# Patient Record
Sex: MALE | Race: OTHER
[De-identification: names, ages, dates, MRNs, and addresses within clinical notes are randomized per-mention and may not be internally consistent; named-entity substitution may affect disease eponyms.]

---

## 2017-11-13 ENCOUNTER — RX ONLY (RX ONLY)
Age: 77
End: 2017-11-13

## 2018-11-07 ENCOUNTER — RX ONLY (RX ONLY)
Age: 78
End: 2018-11-07

## 2021-08-19 ENCOUNTER — TELEPHONE (OUTPATIENT)
Dept: ONCOLOGY | Age: 81
End: 2021-08-19

## 2021-08-19 NOTE — TELEPHONE ENCOUNTER
TYRA with [facility] to call back to schedule consult with md per referral from Dr Carmelo Maharaj    Electronically signed by Juanito Simon on 8/19/2021 at 12:01 PM

## 2021-09-15 ENCOUNTER — INITIAL CONSULT (OUTPATIENT)
Dept: ONCOLOGY | Age: 81
End: 2021-09-15
Payer: MEDICARE

## 2021-09-15 ENCOUNTER — TELEPHONE (OUTPATIENT)
Dept: ONCOLOGY | Age: 81
End: 2021-09-15

## 2021-09-15 ENCOUNTER — HOSPITAL ENCOUNTER (OUTPATIENT)
Age: 81
Discharge: HOME | End: 2021-09-15
Payer: MEDICARE

## 2021-09-15 ENCOUNTER — TELEPHONE (OUTPATIENT)
Dept: CASE MANAGEMENT | Age: 81
End: 2021-09-15

## 2021-09-15 VITALS
WEIGHT: 137.6 LBS | BODY MASS INDEX: 22.11 KG/M2 | HEART RATE: 80 BPM | DIASTOLIC BLOOD PRESSURE: 85 MMHG | TEMPERATURE: 96.8 F | SYSTOLIC BLOOD PRESSURE: 120 MMHG | HEIGHT: 66 IN

## 2021-09-15 DIAGNOSIS — Z85.46 HISTORY OF PROSTATE CANCER: ICD-10-CM

## 2021-09-15 DIAGNOSIS — R91.8 LUNG NODULES: ICD-10-CM

## 2021-09-15 DIAGNOSIS — Z85.51 HISTORY OF BLADDER CANCER: ICD-10-CM

## 2021-09-15 DIAGNOSIS — R91.8 LUNG NODULES: Primary | ICD-10-CM

## 2021-09-15 LAB
ABSOLUTE EOS #: 0.88 K/UL (ref 0–0.4)
ABSOLUTE IMMATURE GRANULOCYTE: ABNORMAL K/UL (ref 0–0.3)
ABSOLUTE LYMPH #: 1.18 K/UL (ref 1–4.8)
ABSOLUTE MONO #: 1.18 K/UL (ref 0.1–0.8)
ALBUMIN SERPL-MCNC: 3.9 G/DL (ref 3.5–5.2)
ALBUMIN/GLOBULIN RATIO: 1.2 (ref 1–2.5)
ALP BLD-CCNC: 144 U/L (ref 40–129)
ALT SERPL-CCNC: 22 U/L (ref 5–41)
ANION GAP SERPL CALCULATED.3IONS-SCNC: 9 MMOL/L (ref 9–17)
AST SERPL-CCNC: 19 U/L
BASOPHILS # BLD: 0 % (ref 0–2)
BASOPHILS ABSOLUTE: 0 K/UL (ref 0–0.2)
BILIRUB SERPL-MCNC: 0.32 MG/DL (ref 0.3–1.2)
BUN BLDV-MCNC: 50 MG/DL (ref 8–23)
BUN/CREAT BLD: ABNORMAL (ref 9–20)
CALCIUM SERPL-MCNC: 10.8 MG/DL (ref 8.6–10.4)
CHLORIDE BLD-SCNC: 110 MMOL/L (ref 98–107)
CO2: 18 MMOL/L (ref 20–31)
CREAT SERPL-MCNC: 1.64 MG/DL (ref 0.7–1.2)
DIFFERENTIAL TYPE: ABNORMAL
EOSINOPHILS RELATIVE PERCENT: 9 % (ref 1–4)
GFR AFRICAN AMERICAN: 49 ML/MIN
GFR NON-AFRICAN AMERICAN: 40 ML/MIN
GFR SERPL CREATININE-BSD FRML MDRD: ABNORMAL ML/MIN/{1.73_M2}
GFR SERPL CREATININE-BSD FRML MDRD: ABNORMAL ML/MIN/{1.73_M2}
GLUCOSE BLD-MCNC: 100 MG/DL (ref 70–99)
HCT VFR BLD CALC: 45.2 % (ref 41–53)
HEMOGLOBIN: 14.6 G/DL (ref 13.5–17.5)
IMMATURE GRANULOCYTES: ABNORMAL %
LYMPHOCYTES # BLD: 12 % (ref 24–44)
MCH RBC QN AUTO: 28.9 PG (ref 26–34)
MCHC RBC AUTO-ENTMCNC: 32.3 G/DL (ref 31–37)
MCV RBC AUTO: 89.5 FL (ref 80–100)
MONOCYTES # BLD: 12 % (ref 1–7)
MORPHOLOGY: NORMAL
NRBC AUTOMATED: ABNORMAL PER 100 WBC
PDW BLD-RTO: 15.7 % (ref 12.5–15.4)
PLATELET # BLD: 253 K/UL (ref 140–450)
PLATELET ESTIMATE: ABNORMAL
PMV BLD AUTO: 7.4 FL (ref 6–12)
POTASSIUM SERPL-SCNC: 5.2 MMOL/L (ref 3.7–5.3)
PROSTATE SPECIFIC ANTIGEN: <0.02 UG/L
RBC # BLD: 5.05 M/UL (ref 4.5–5.9)
RBC # BLD: ABNORMAL 10*6/UL
SEG NEUTROPHILS: 67 % (ref 36–66)
SEGMENTED NEUTROPHILS ABSOLUTE COUNT: 6.56 K/UL (ref 1.8–7.7)
SODIUM BLD-SCNC: 137 MMOL/L (ref 135–144)
TOTAL PROTEIN: 7.2 G/DL (ref 6.4–8.3)
WBC # BLD: 9.8 K/UL (ref 3.5–11)
WBC # BLD: ABNORMAL 10*3/UL

## 2021-09-15 PROCEDURE — G8427 DOCREV CUR MEDS BY ELIG CLIN: HCPCS | Performed by: INTERNAL MEDICINE

## 2021-09-15 PROCEDURE — 80053 COMPREHEN METABOLIC PANEL: CPT

## 2021-09-15 PROCEDURE — 99205 OFFICE O/P NEW HI 60 MIN: CPT | Performed by: INTERNAL MEDICINE

## 2021-09-15 PROCEDURE — 84153 ASSAY OF PSA TOTAL: CPT

## 2021-09-15 PROCEDURE — 85025 COMPLETE CBC W/AUTO DIFF WBC: CPT

## 2021-09-15 PROCEDURE — G8420 CALC BMI NORM PARAMETERS: HCPCS | Performed by: INTERNAL MEDICINE

## 2021-09-15 PROCEDURE — 36415 COLL VENOUS BLD VENIPUNCTURE: CPT

## 2021-09-15 RX ORDER — ACETAMINOPHEN 325 MG/1
650 TABLET ORAL EVERY 6 HOURS PRN
COMMUNITY

## 2021-09-15 NOTE — TELEPHONE ENCOUNTER
Name: Kim Perez  : 1940  MRN: [id number]    Oncology Navigation Follow-Up Note    Navigator received referral from Dr. Enma Morejon and reviewing chart now. Pt. Scheduled for PET , plan to follow.    Electronically signed by Edwin Tejeda RN on 9/15/2021 at 3:08 PM

## 2021-09-15 NOTE — TELEPHONE ENCOUNTER
AVS from 9/15/21    Ct pet  Labs now  rv in 1-2 weeks with ct pet    CT scheduled 9/21/21 @ 8am    Labs drawn today at [facility]    RV scheduled 9/24/21 @ 8am    PT was given AVS and an appt schedule    Electronically signed by Gordon Michelle on 9/15/2021 at 1:56 PM

## 2021-09-15 NOTE — PROGRESS NOTES
Nayana Santo                                                                                                                  9/15/2021  MRN:   [id number]  YOB: 1940  PCP:                           No primary care provider on file. Referring Physician: Cathy Fontenot  Treating Physician Name: Miladis Sotelo MD      Reason for consultation:  Chief Complaint   Patient presents with    Consultation     History of Bladder Cancer    Other     Lung Nodgles       Current problems:  Bilateral lung nodules  History of high-grade urothelial cancer, muscle invasive, pathological stage pT3b pN0 M0-  History of prostate cancer-2010  CKD    Active and recent treatments:  Status post radical prostatectomy-2010 and salvage EBRT-2015  Status post radical robotic cystectomy with PLND and left nephroureterectomy with IC-1/4/2021    Summary of Case/History:    Nayana Santo a 79 y.o.male is a patient with history of hypertension prostate cancer status post prostatectomy in 2010 and salvage radiation therapy several years after was recently diagnosed with muscle invasive bladder cancer. Patient initially presented to outside hospital with complaints of flank pain and constipation. Imaging studies showed left hydronephrosis. Patient underwent cystoscopy on 10/2020 which showed a bladder tumor replacing left ureteral orifice and small tumors on the right and left lateral bladder walls. Patient underwent TURBT on 10/2/2020. Pathology revealed high-grade urothelial cancer muscle invasive. Patient underwent CT PET for staging which was negative for any distant metastasis. Patient underwent radical robotic cystectomy with PLND and left nephroureterectomy in January 2021. Patient post surgical recovery was slow and patient was in and out of the hospital and rehab due to failure to thrive. More recently patient has moved to [place] to be closer to family, his niece.   Patient is currently residing in assisted living and is now doing better. Patient does admit that he has been having memory loss and it was getting difficult for him to take care of himself at home. Patient recently had CT scans done which showed lung nodules concerning for malignancy. Patient is now establishing care in [place] and was seen by urology who referred patient to medical oncology. Patient at the time of evaluation denies any bloody urine. He has a urostomy bag. Denies shortness of breath. Denies any pain. Past Medical History:   Past Medical History:   Diagnosis Date    Bladder cancer ([address]     Chronic kidney disease     Hypertension        Past Surgical History:     Past Surgical History:   Procedure Laterality Date    BLADDER REMOVAL      KIDNEY REMOVAL Left     KNEE ARTHROPLASTY      PROSTATE SURGERY      ROTATOR CUFF REPAIR Right        Patient Family Social History:     Social History     Socioeconomic History    Marital status: Single     Spouse name: None    Number of children: None    Years of education: None    Highest education level: None   Occupational History    None   Tobacco Use    Smoking status: Never Smoker    Smokeless tobacco: Never Used   Substance and Sexual Activity    Alcohol use: Not Currently    Drug use: Never    Sexual activity: None   Other Topics Concern    None   Social History Narrative    None     Social Determinants of Health     Financial Resource Strain:     Difficulty of Paying Living Expenses:    Food Insecurity:     Worried About Running Out of Food in the Last Year:     Ran Out of Food in the Last Year:    Transportation Needs:     Lack of Transportation (Medical):      Lack of Transportation (Non-Medical):    Physical Activity:     Days of Exercise per Week:     Minutes of Exercise per Session:    Stress:     Feeling of Stress :    Social Connections:     Frequency of Communication with Friends and Family:     Frequency of Social Gatherings with Friends and Family:     Attends [employer] Services:     Active Member of Clubs or Organizations:     Attends Club or Organization Meetings:     Marital Status:    Intimate Partner Violence:     Fear of Current or Ex-Partner:     Emotionally Abused:     Physically Abused:     Sexually Abused:      Family History   Problem Relation Age of Onset    Heart Disease Mother     Stroke Father     Breast Cancer Father     Other Sister         ALS    Heart Disease Brother      Current Medications:     Current Outpatient Medications   Medication Sig Dispense Refill    acetaminophen (TYLENOL) 325 MG tablet Take 650 mg by mouth every 6 hours as needed for Pain       No current facility-administered medications for this visit. Allergies:   Codeine    Review of Systems:    Constitutional: No fever or chills. No night sweats, no weight loss   Eyes: No eye discharge, double vision, or eye pain   HEENT: negative for sore mouth, sore throat, hoarseness and voice change   Respiratory: negative for cough , sputum, dyspnea, wheezing, hemoptysis, chest pain   Cardiovascular: negative for chest pain, dyspnea, palpitations, orthopnea, PND   Gastrointestinal: negative for nausea, vomiting, diarrhea, constipation, abdominal pain, Dysphagia, hematemesis and hematochezia   Genitourinary: negative for frequency, dysuria, nocturia, urinary incontinence, and hematuria   Integument: negative for rash, skin lesions, bruises. Hematologic/Lymphatic: negative for easy bruising, bleeding, lymphadenopathy, or petechiae   Endocrine: negative for heat or cold intolerance,weight changes, change in bowel habits and hair loss   Musculoskeletal: negative for myalgias, arthralgias, pain, joint swelling,and bone pain   Neurological: negative for headaches, dizziness, seizures, weakness, numbness.   Positive memory loss        Physical Exam:    Vitals: /85   Pulse 80   Temp 96.8 °F (36 °C) (Temporal)   Ht 5' 6\" (1.676 m)   Wt 137 lb 9.6 oz (62.4 kg)   BMI 22.21 kg/m²   General appearance - well appearing, no in pain or distress  Mental status - AAO X3  Eyes - pupils equal and reactive, extraocular eye movements intact  Mouth - mucous membranes moist, pharynx normal without lesions  Neck - supple, no significant adenopathy  Lymphatics - no palpable lymphadenopathy, no hepatosplenomegaly  Chest - clear to auscultation, no wheezes, rales or rhonchi, symmetric air entry  Heart - normal rate, regular rhythm, normal S1, S2, no murmurs  Abdomen - soft, nontender, nondistended, no masses or organomegaly. urostomy bag in place  Neurological - alert, oriented, normal speech, no focal findings or movement disorder noted  Extremities - peripheral pulses normal, no pedal edema, no clubbing or cyanosis  Skin - normal coloration and turgor, no rashes, no suspicious skin lesions noted       DATA:    SYNOPTIC REPORT FOR BLADDER CARCINOMA:  Cystectomy (p=partial, r=radical, rc=radical cystoprostatectomy): R  Neoadjuvant treatment (y=yes, n=no): N  Invasive/papillary tumor location (r=right lateral, l=left lateral,   a=anterior, p=posterior, d=dome, t=trigone, u=ureter): U, L, T, P  Invasive/papillary tumor size (cm) (na=not applicable): 2.7 CM  Histologic type: UROTHELIAL  Grade (Urothelial: l=low, h=high; Adeno/Squamous cell: 1-3): H  Lymphovascular invasion (n=no, y=yes): Y     Tumor extent (n=no, y=yes, na=not applicable): Invasion of lamina propria: Y  Invasion of inner half of muscularis propria: Y  Invasion of outer half of muscularis propria: Y  Microscopic invasion of perivesicular adipose: Y  Macroscopic invasion of perivesicular adipose:  Y  Prostate stroma, seminal vesicle, uterus, vagina invasion: N  In situ tumor in urethra or prostatic ducts: N  Invasion of pelvic wall or abdominal wall: NA     Margins (n=negative, p=positive, na=not applicable): N  Location of positive margin: NA     Regional lymph nodes (n=no, y=yes, na=not applicable):  Number examined: 28  Number positive: 0  Extranodal extension: NA    Additional findings (ex: invasion of ovary/fallopian tube/bowel): PERINEURAL INVASION  pTNM: pT3b N0  TURBT (10/2/20) by Dr. Elin Rangel     Imaging Studies  CT A/P 7/15/21:  Severe right hydroureter ureter nephrosis despite ureteral stent. * Nonobstructing right renal calculi. * No definitive evidence of malignancy. CT chest 7/15/21:  Multiple bilateral pulmonary nodules largest measures 1 cm in the right upper lobe and right lower lobe.  These could be benign or malignant.  Comparison to old examination from outside institution suggested.  If these are not available, a PET scan could be obtained. Impression:  Bilateral lung nodules  History of high-grade urothelial cancer, muscle invasive, pathological stage pT3b pN0 M0-10/2019. Status post radical robotic cystectomy with PLND and left nephroureterectomy with IC-1/4/2021  History of prostate cancer-2010. Status post radical prostatectomy-2010 and salvage EBRT-2015  CKD     Plan:  I had a detailed discussion with the patient and personally went over results of lab work-up imaging studies and other relevant clinical data. Reviewed records from outside facility. Discussed with patient's niece  Patient has history of prostate cancer as well as bladder cancer. Lung nodules are suspicious for recurrent disease. Given natural history of prostate cancer and bladder cancer I am more concerned about recurrence of bladder cancer. We will check PSA. Reviewed goals and expectations. Patient at this point is interested in further work-up before he makes any decisions. He understands that he may need a biopsy as well down the road  Continue to monitor kidney function. Patient has right-sided hydronephrosis. Patient follows up with urology. Discussed different scenarios. If patient is diagnosed with recurrent prostate cancer we would offer androgen deprivation therapy.   If patient is diagnosed with recurrent

## 2021-09-16 ENCOUNTER — TELEPHONE (OUTPATIENT)
Dept: CASE MANAGEMENT | Age: 81
End: 2021-09-16

## 2021-09-16 NOTE — TELEPHONE ENCOUNTER
Lung Navigator reachig out to pt. Offering assistance, but could only leave a message, plan to follow. 11:30 Received call from MANJU ALVAREZ [facility] and pt. Lives in assisted Living and [facility]  informing navigator she is the first point of contact, plan to follow.

## 2021-09-21 ENCOUNTER — HOSPITAL ENCOUNTER (OUTPATIENT)
Dept: NUCLEAR MEDICINE | Age: 81
Discharge: HOME | End: 2021-09-23
Payer: MEDICARE

## 2021-09-21 DIAGNOSIS — R91.8 LUNG NODULES: ICD-10-CM

## 2021-09-21 DIAGNOSIS — Z85.51 HISTORY OF BLADDER CANCER: ICD-10-CM

## 2021-09-21 DIAGNOSIS — Z85.46 HISTORY OF PROSTATE CANCER: ICD-10-CM

## 2021-09-21 LAB — GLUCOSE BLD-MCNC: 91 MG/DL (ref 75–110)

## 2021-09-21 PROCEDURE — 78815 PET IMAGE W/CT SKULL-THIGH: CPT

## 2021-09-21 PROCEDURE — 82947 ASSAY GLUCOSE BLOOD QUANT: CPT

## 2021-09-21 PROCEDURE — 2580000003 HC RX 258: Performed by: INTERNAL MEDICINE

## 2021-09-21 PROCEDURE — 3430000000 HC RX DIAGNOSTIC RADIOPHARMACEUTICAL: Performed by: INTERNAL MEDICINE

## 2021-09-21 PROCEDURE — A9552 F18 FDG: HCPCS | Performed by: INTERNAL MEDICINE

## 2021-09-21 RX ORDER — SODIUM CHLORIDE 0.9 % (FLUSH) 0.9 %
10 SYRINGE (ML) INJECTION ONCE
Status: COMPLETED | OUTPATIENT
Start: 2021-09-21 | End: 2021-09-21

## 2021-09-21 RX ORDER — FLUDEOXYGLUCOSE F 18 200 MCI/ML
10 INJECTION, SOLUTION INTRAVENOUS
Status: COMPLETED | OUTPATIENT
Start: 2021-09-21 | End: 2021-09-21

## 2021-09-21 RX ADMIN — FLUDEOXYGLUCOSE F 18 12.97 MILLICURIE: 200 INJECTION, SOLUTION INTRAVENOUS at 07:53

## 2021-09-21 RX ADMIN — SODIUM CHLORIDE, PRESERVATIVE FREE 10 ML: 5 INJECTION INTRAVENOUS at 07:53

## 2021-09-22 ENCOUNTER — TELEPHONE (OUTPATIENT)
Dept: INFUSION THERAPY | Age: 81
End: 2021-09-22

## 2021-09-22 NOTE — TELEPHONE ENCOUNTER
Call from [address] Radiology asking for confirmation that our office rec'd PET results. Confirmed results are in chart and pt has f/u this Fri 9/24/21 for results.

## 2021-09-24 ENCOUNTER — TELEPHONE (OUTPATIENT)
Dept: INTERVENTIONAL RADIOLOGY/VASCULAR | Age: 81
End: 2021-09-24

## 2021-09-24 ENCOUNTER — OFFICE VISIT (OUTPATIENT)
Dept: ONCOLOGY | Age: 81
End: 2021-09-24
Payer: MEDICARE

## 2021-09-24 ENCOUNTER — TELEPHONE (OUTPATIENT)
Dept: ONCOLOGY | Age: 81
End: 2021-09-24

## 2021-09-24 ENCOUNTER — TELEPHONE (OUTPATIENT)
Dept: CASE MANAGEMENT | Age: 81
End: 2021-09-24

## 2021-09-24 VITALS
TEMPERATURE: 96.8 F | HEART RATE: 103 BPM | WEIGHT: 139 LBS | SYSTOLIC BLOOD PRESSURE: 119 MMHG | BODY MASS INDEX: 22.44 KG/M2 | DIASTOLIC BLOOD PRESSURE: 78 MMHG

## 2021-09-24 DIAGNOSIS — R91.8 LUNG NODULES: ICD-10-CM

## 2021-09-24 DIAGNOSIS — R54 ADVANCED AGE: ICD-10-CM

## 2021-09-24 DIAGNOSIS — C67.9 MALIGNANT NEOPLASM OF URINARY BLADDER, UNSPECIFIED SITE (HCC): Primary | ICD-10-CM

## 2021-09-24 DIAGNOSIS — Z85.46 HISTORY OF PROSTATE CANCER: ICD-10-CM

## 2021-09-24 PROCEDURE — G8420 CALC BMI NORM PARAMETERS: HCPCS | Performed by: INTERNAL MEDICINE

## 2021-09-24 PROCEDURE — G8427 DOCREV CUR MEDS BY ELIG CLIN: HCPCS | Performed by: INTERNAL MEDICINE

## 2021-09-24 PROCEDURE — 4040F PNEUMOC VAC/ADMIN/RCVD: CPT | Performed by: INTERNAL MEDICINE

## 2021-09-24 PROCEDURE — 1123F ACP DISCUSS/DSCN MKR DOCD: CPT | Performed by: INTERNAL MEDICINE

## 2021-09-24 PROCEDURE — 99211 OFF/OP EST MAY X REQ PHY/QHP: CPT | Performed by: INTERNAL MEDICINE

## 2021-09-24 PROCEDURE — 1036F TOBACCO NON-USER: CPT | Performed by: INTERNAL MEDICINE

## 2021-09-24 PROCEDURE — 99214 OFFICE O/P EST MOD 30 MIN: CPT | Performed by: INTERNAL MEDICINE

## 2021-09-24 NOTE — TELEPHONE ENCOUNTER
AVS from 9/24/21    Bipsy per ir  Preferably at [facility]    rv after biopsy    IR notified of bx    RV TBD    PT was given AVS and an appt schedule    Electronically signed by Emelina Christensen on 9/24/2021 at 12:21 PM

## 2021-09-24 NOTE — TELEPHONE ENCOUNTER
Name: Shey Nelson  : 1940  MRN: [id number]    Oncology Navigation Follow-Up Note  Navigator met with pt. And daughter face to face offering assistance . Business card given to pt. Plan to meet bck with them post visit today. 8:51 Pt. To have biopsy per IR preferrably [facility], but flexible and return post Bx, plan to follow.    Electronically signed by Deandre Sumner RN on 2021 at 8:31 AM

## 2021-09-24 NOTE — PROGRESS NOTES
Worried About [facility] in the Last Year:    [address] in the Last Year:    Transportation Needs:     Lack of Transportation (Medical):  Lack of Transportation (Non-Medical):    Physical Activity:     Days of Exercise per Week:     Minutes of Exercise per Session:    Stress:     Feeling of Stress :    Social Connections:     Frequency of Communication with Friends and Family:     Frequency of Social Gatherings with Friends and Family:     Attends [employer] Services:     Active Member of Clubs or Organizations:     Attends Club or Organization Meetings:     Marital Status:    Intimate Partner Violence:     Fear of Current or Ex-Partner:     Emotionally Abused:     Physically Abused:     Sexually Abused:      Family History   Problem Relation Age of Onset    Heart Disease Mother     Stroke Father     Breast Cancer Father     Other Sister         ALS    Heart Disease Brother      Current Medications:     Current Outpatient Medications   Medication Sig Dispense Refill    acetaminophen (TYLENOL) 325 MG tablet Take 650 mg by mouth every 6 hours as needed for Pain       No current facility-administered medications for this visit. Allergies:   Codeine    Review of Systems:    Constitutional: No fever or chills. No night sweats, no weight loss   Eyes: No eye discharge, double vision, or eye pain   HEENT: negative for sore mouth, sore throat, hoarseness and voice change   Respiratory: negative for cough , sputum, dyspnea, wheezing, hemoptysis, chest pain   Cardiovascular: negative for chest pain, dyspnea, palpitations, orthopnea, PND   Gastrointestinal: negative for nausea, vomiting, diarrhea, constipation, abdominal pain, Dysphagia, hematemesis and hematochezia   Genitourinary: negative for frequency, dysuria, nocturia, urinary incontinence, and hematuria   Integument: negative for rash, skin lesions, bruises.    Hematologic/Lymphatic: negative for easy bruising, bleeding, lymphadenopathy, or petechiae   Endocrine: negative for heat or cold intolerance,weight changes, change in bowel habits and hair loss   Musculoskeletal: negative for myalgias, arthralgias, pain, joint swelling,and bone pain   Neurological: negative for headaches, dizziness, seizures, weakness, numbness. Positive memory loss        Physical Exam:    Vitals: /78   Pulse 103   Temp 96.8 °F (36 °C) (Temporal)   Wt 139 lb (63 kg)   BMI 22.44 kg/m²   General appearance - well appearing, no in pain or distress  Mental status - AAO X3  Eyes - pupils equal and reactive, extraocular eye movements intact  Mouth - mucous membranes moist, pharynx normal without lesions  Neck - supple, no significant adenopathy  Lymphatics - no palpable lymphadenopathy, no hepatosplenomegaly  Chest - clear to auscultation, no wheezes, rales or rhonchi, symmetric air entry  Heart - normal rate, regular rhythm, normal S1, S2, no murmurs  Abdomen - soft, nontender, nondistended, no masses or organomegaly. urostomy bag in place  Neurological - alert, oriented, normal speech, no focal findings or movement disorder noted  Extremities - peripheral pulses normal, no pedal edema, no clubbing or cyanosis  Skin - normal coloration and turgor, no rashes, no suspicious skin lesions noted       DATA:    SYNOPTIC REPORT FOR BLADDER CARCINOMA:  Cystectomy (p=partial, r=radical, rc=radical cystoprostatectomy): R  Neoadjuvant treatment (y=yes, n=no): N  Invasive/papillary tumor location (r=right lateral, l=left lateral,   a=anterior, p=posterior, d=dome, t=trigone, u=ureter): U, L, T, P  Invasive/papillary tumor size (cm) (na=not applicable): 2.7 CM  Histologic type: UROTHELIAL  Grade (Urothelial: l=low, h=high; Adeno/Squamous cell: 1-3): H  Lymphovascular invasion (n=no, y=yes): Y     Tumor extent (n=no, y=yes, na=not applicable):   Invasion of lamina propria: Y  Invasion of inner half of muscularis propria: Y  Invasion of outer half of muscularis propria: Y  Microscopic invasion of perivesicular adipose: Y  Macroscopic invasion of perivesicular adipose: Y  Prostate stroma, seminal vesicle, uterus, vagina invasion: N  In situ tumor in urethra or prostatic ducts: N  Invasion of pelvic wall or abdominal wall: NA     Margins (n=negative, p=positive, na=not applicable): N  Location of positive margin: NA     Regional lymph nodes (n=no, y=yes, na=not applicable):  Number examined: 28  Number positive: 0  Extranodal extension: NA    Additional findings (ex: invasion of ovary/fallopian tube/bowel): PERINEURAL INVASION  pTNM: pT3b N0  TURBT (10/2/20) by Dr. Ila Schilling [facility]     Imaging Studies  CT A/P 7/15/21:  Severe right hydroureter ureter nephrosis despite ureteral stent. * Nonobstructing right renal calculi. * No definitive evidence of malignancy. CT chest 7/15/21:  Multiple bilateral pulmonary nodules largest measures 1 cm in the right upper lobe and right lower lobe.  These could be benign or malignant.  Comparison to old examination from outside institution suggested.  If these are not available, a PET scan could be obtained. Impression:  Bilateral lung nodules  History of high-grade urothelial cancer, muscle invasive, pathological stage pT3b pN0 M0-10/2019. Status post radical robotic cystectomy with PLND and left nephroureterectomy with IC-1/4/2021  History of prostate cancer-2010. Status post radical prostatectomy-2010 and salvage EBRT-2015  CKD     Plan:  I had a detailed discussion with the patient and personally went over results of lab work-up imaging studies and other relevant clinical data. Reviewed records from outside facility  Reviewed results of CT PET  Reviewed images of CT PET with the patient  Reviewed goals and expectations. Patient is interested in pursuing further work-up.   We will arrange for biopsy of one of the lung nodules  PSA was undetectable clinically doubt patient has recurrence of bladder cancer  We will continue to monitor kidney function  Patient has right-sided hydronephrosis. Patient follows up with urology. Discussed different scenarios. If patient is diagnosed with recurrent bladder cancer will consider NGS testing for actionable mutations as well as evaluation for PD-L1 status. NCCN guidelines were reviewed and discussed with the patient. The diagnosis and care plan were discussed with the patient in detail. I discussed the natural history of the disease, prognosis, risks and goals of therapy and answered all the patients questions to the best of my ability. Patient expressed understanding and was in agreement. Anthony Johnson MD      This note is created with the assistance of a speech recognition program.  While intending to generate a document that actually reflects the content of the visit, the document can still have some errors including those of syntax and sound a like substitutions which may escape proof reading. It such instances, actual meaning can be extrapolated by contextual diversion.

## 2021-09-28 ENCOUNTER — TELEPHONE (OUTPATIENT)
Dept: ONCOLOGY | Age: 81
End: 2021-09-28

## 2021-09-28 NOTE — TELEPHONE ENCOUNTER
LVM for patient to schedule follow up appointment 2 weeks after scheduled biopsy    Electronically signed by Jeanne Raoms on 9/28/2021 at 9:02 AM

## 2021-10-06 ENCOUNTER — HOSPITAL ENCOUNTER (OUTPATIENT)
Dept: GENERAL RADIOLOGY | Age: 81
Discharge: HOME | End: 2021-10-08
Payer: MEDICARE

## 2021-10-06 ENCOUNTER — HOSPITAL ENCOUNTER (OUTPATIENT)
Dept: CT IMAGING | Age: 81
Discharge: HOME | End: 2021-10-08
Payer: MEDICARE

## 2021-10-06 VITALS
OXYGEN SATURATION: 100 % | HEIGHT: 66 IN | DIASTOLIC BLOOD PRESSURE: 67 MMHG | HEART RATE: 75 BPM | WEIGHT: 138 LBS | BODY MASS INDEX: 22.18 KG/M2 | TEMPERATURE: 97.3 F | RESPIRATION RATE: 16 BRPM | SYSTOLIC BLOOD PRESSURE: 130 MMHG

## 2021-10-06 DIAGNOSIS — C67.9 MALIGNANT NEOPLASM OF URINARY BLADDER, UNSPECIFIED SITE (HCC): ICD-10-CM

## 2021-10-06 LAB
INR BLD: 1
PARTIAL THROMBOPLASTIN TIME: 26.1 SEC (ref 23.9–33.8)
PLATELET # BLD: 276 K/UL (ref 138–453)
PROTHROMBIN TIME: 13 SEC (ref 11.5–14.2)

## 2021-10-06 PROCEDURE — 88341 IMHCHEM/IMCYTCHM EA ADD ANTB: CPT

## 2021-10-06 PROCEDURE — 85730 THROMBOPLASTIN TIME PARTIAL: CPT

## 2021-10-06 PROCEDURE — 85049 AUTOMATED PLATELET COUNT: CPT

## 2021-10-06 PROCEDURE — 2580000003 HC RX 258: Performed by: RADIOLOGY

## 2021-10-06 PROCEDURE — 88333 PATH CONSLTJ SURG CYTO XM 1: CPT

## 2021-10-06 PROCEDURE — 7100000010 HC PHASE II RECOVERY - FIRST 15 MIN

## 2021-10-06 PROCEDURE — 71045 X-RAY EXAM CHEST 1 VIEW: CPT

## 2021-10-06 PROCEDURE — 2500000003 HC RX 250 WO HCPCS: Performed by: RADIOLOGY

## 2021-10-06 PROCEDURE — 77012 CT SCAN FOR NEEDLE BIOPSY: CPT

## 2021-10-06 PROCEDURE — 7100000011 HC PHASE II RECOVERY - ADDTL 15 MIN

## 2021-10-06 PROCEDURE — 88305 TISSUE EXAM BY PATHOLOGIST: CPT

## 2021-10-06 PROCEDURE — 85610 PROTHROMBIN TIME: CPT

## 2021-10-06 PROCEDURE — 88342 IMHCHEM/IMCYTCHM 1ST ANTB: CPT

## 2021-10-06 PROCEDURE — 2709999900 CT NEEDLE BIOPSY LUNG PERCUTANEOUS W IMAGING GUIDANCE

## 2021-10-06 RX ORDER — SODIUM CHLORIDE 0.9 % (FLUSH) 0.9 %
5-40 SYRINGE (ML) INJECTION PRN
Status: DISCONTINUED | OUTPATIENT
Start: 2021-10-06 | End: 2021-10-09 | Stop reason: HOSPADM

## 2021-10-06 RX ORDER — SODIUM CHLORIDE 9 MG/ML
INJECTION, SOLUTION INTRAVENOUS CONTINUOUS
Status: DISCONTINUED | OUTPATIENT
Start: 2021-10-06 | End: 2021-10-09 | Stop reason: HOSPADM

## 2021-10-06 RX ORDER — ACETAMINOPHEN 325 MG/1
650 TABLET ORAL EVERY 4 HOURS PRN
Status: DISCONTINUED | OUTPATIENT
Start: 2021-10-06 | End: 2021-10-09 | Stop reason: HOSPADM

## 2021-10-06 RX ORDER — LIDOCAINE HYDROCHLORIDE 10 MG/ML
INJECTION, SOLUTION INFILTRATION; PERINEURAL
Status: COMPLETED | OUTPATIENT
Start: 2021-10-06 | End: 2021-10-06

## 2021-10-06 RX ORDER — SODIUM CHLORIDE 9 MG/ML
25 INJECTION, SOLUTION INTRAVENOUS PRN
Status: DISCONTINUED | OUTPATIENT
Start: 2021-10-06 | End: 2021-10-09 | Stop reason: HOSPADM

## 2021-10-06 RX ADMIN — SODIUM CHLORIDE 25 ML: 9 INJECTION, SOLUTION INTRAVENOUS at 09:30

## 2021-10-06 RX ADMIN — LIDOCAINE HYDROCHLORIDE 5 ML: 10 INJECTION, SOLUTION INFILTRATION; PERINEURAL at 10:46

## 2021-10-06 ASSESSMENT — PAIN SCALES - GENERAL
PAINLEVEL_OUTOF10: 0
PAINLEVEL_OUTOF10: 0

## 2021-10-06 ASSESSMENT — PAIN - FUNCTIONAL ASSESSMENT: PAIN_FUNCTIONAL_ASSESSMENT: 0-10

## 2021-10-06 ASSESSMENT — PAIN DESCRIPTION - ORIENTATION: ORIENTATION: LEFT;UPPER;OUTER

## 2021-10-06 ASSESSMENT — PAIN DESCRIPTION - DESCRIPTORS: DESCRIPTORS: TENDER

## 2021-10-06 ASSESSMENT — PAIN DESCRIPTION - LOCATION: LOCATION: BACK

## 2021-10-06 ASSESSMENT — PAIN DESCRIPTION - PAIN TYPE: TYPE: ACUTE PAIN

## 2021-10-06 NOTE — OP NOTE
Brief Postoperative Note    Carmen Kitchen  YOB: 1940  [id number]    Pre-operative Diagnosis: lung nodules    Post-operative Diagnosis: Same    Procedure: ct bx lll nodule    Anesthesia: Local   Surgeons/Assistants: Janie Gilford, MD     Estimated Blood Loss: minimal    Complications: none immediate    Specimens: were obtained      Electronically signed by Janie Gilford, MD on 10/6/2021 at 11:10 AM

## 2021-10-06 NOTE — H&P
Interval H&P Note    Pt Name: Latricia Galloway  MRN: [id number]  YOB: 1940  Date of evaluation: 10/6/2021      [x] I have reviewed in [facility] the Oncology Progress Note by Dr Jessica Cook dated 9/24/21 attached below for an Interval History and Physical note. [x] I have examined  Latricia Galloway  There are no changes to the patient who is scheduled for a lung biopsy with CT in IR by Dr Tracy Yen for malignant neoplasm of bladder. The patient denies new health changes, fever, chills, wheezing, cough, increased SOB, chest pain, open sores or wounds. PMH, Surgical History, Social History, Psych, and Family History reviewed and updated in EPIC in appropriate section. Vital signs: /69   Pulse 93   Temp 97.1 °F (36.2 °C) (Temporal)   Resp 16   Ht 5' 6\" (1.676 m)   Wt 138 lb (62.6 kg)   SpO2 98%   BMI 22.27 kg/m²     Allergies:  Codeine    Medications:    Prior to Admission medications    Medication Sig Start Date End Date Taking? Authorizing Provider   acetaminophen (TYLENOL) 325 MG tablet Take 650 mg by mouth every 6 hours as needed for Pain   Yes Historical Provider, MD         This is a 79 y.o. male who is pleasant, cooperative, alert and oriented x3, in no acute distress    Physical Exam:  Lungs: Bilateral equal air entry, unlabored, clear to ausculation, no wheezing, rales or rhonchi, normal effort  Cardiovascular: HR 93 normal rate, regular rhythm, no murmur, gallop, rub. Abdomen: urostomy draining clear yellow urine in bag. Soft, nontender, nondistended, normal bowel sounds. Labs:  Recent Labs     10/06/21  0931 09/15/21  0952 09/15/21  0952   HGB  --   --  14.6   HCT  --   --  45.2   WBC  --   --  9.8   MCV  --   --  89.5      < > 253   NA  --   --  137   K  --   --  5.2   CL  --   --  110*   CO2  --   --  18*   BUN  --   --  50*   CREATININE  --   --  1.64*   GLUCOSE  --   --  100*   AST  --   --  19   ALT  --   --  22   LABALBU  --   --  3.9    < > = values in this interval not displayed. No results for input(s): COVID19 in the last 720 hours. GABE Anthony CNP   Electronically signed 10/6/2021 at 9:41 Mariano Simmons MD   Physician   Specialty:  Hematology and Oncology   Progress Notes       Signed   Encounter Date:  9/24/2021         Related encounter: Office Visit from 9/24/2021 in [facility]                                                                                                                  9/24/2021  MRN:                           [id number]  YOB: 1940  PCP:                           GABE Montes CNP  Referring Physician: No ref. provider found  Treating Physician Name: Fredrick Hogan MD        Reason for visit:       Chief Complaint   Patient presents with    Follow-up       review status of disease    Results       go over pet scan and labs         Current problems:  Bilateral lung nodules  History of high-grade urothelial cancer, muscle invasive, pathological stage pT3b pN0 M0-  History of prostate cancer-2010  CKD     Active and recent treatments:  Status post radical prostatectomy-2010 and salvage EBRT-2015  Status post radical robotic cystectomy with PLND and left nephroureterectomy with IC-1/4/2021     Interim History:     Patient presents to the clinic accompanied by his niece to discuss results of CT PET. Overall patient doing well. [place] [employer] [place]. Denies any pain. Denies any shortness of breath. Continues to live in assisted living. He has put on couple of pounds since last office visit     During this visit patient's allergy, social, medical, surgical history and medications were reviewed and updated.      Summary of Case/History:     Shira Matamoros a 79 y.o.male is a patient with history of hypertension prostate cancer status post prostatectomy in 2010 and salvage radiation therapy several years after was recently diagnosed with muscle invasive bladder cancer. Patient initially presented to outside hospital with complaints of flank pain and constipation. Imaging studies showed left hydronephrosis. Patient underwent cystoscopy on 10/2020 which showed a bladder tumor replacing left ureteral orifice and small tumors on the right and left lateral bladder walls. Patient underwent TURBT on 10/2/2020. Pathology revealed high-grade urothelial cancer muscle invasive. Patient underwent CT PET for staging which was negative for any distant metastasis. Patient underwent radical robotic cystectomy with PLND and left nephroureterectomy in January 2021. Patient post surgical recovery was slow and patient was in and out of the hospital and rehab due to failure to thrive. More recently patient has moved to [place] to be closer to family, his niece. Patient is currently residing in assisted living and is now doing better. Patient does admit that he has been having memory loss and it was getting difficult for him to take care of himself at home. Patient recently had CT scans done which showed lung nodules concerning for malignancy. Patient is now establishing care in [place] and was seen by urology who referred patient to medical oncology. Patient at the time of evaluation denies any bloody urine. He has a urostomy bag. Denies shortness of breath. Denies any pain.      Past Medical History:   Past Medical History        Past Medical History:   Diagnosis Date    Bladder cancer ([address]      Chronic kidney disease      Hypertension              Past Surgical History:     Past Surgical History         Past Surgical History:   Procedure Laterality Date    BLADDER REMOVAL        KIDNEY REMOVAL Left      KNEE ARTHROPLASTY        PROSTATE SURGERY        ROTATOR CUFF REPAIR Right              Patient Family Social History:     Social History   Social History            Socioeconomic History  Marital status: Single       Spouse name: None    Number of children: None    Years of education: None    Highest education level: None   Occupational History    None   Tobacco Use    Smoking status: Never Smoker    Smokeless tobacco: Never Used   Substance and Sexual Activity    Alcohol use: Not Currently    Drug use: Never    Sexual activity: None   Other Topics Concern    None   Social History Narrative    None      Social Determinants of Health          Financial Resource Strain:     Difficulty of Paying Living Expenses:    Food Insecurity:     Worried About Running Out of Food in the Last Year:     Ran Out of Food in the Last Year:    Transportation Needs:     Lack of Transportation (Medical):  Lack of Transportation (Non-Medical):    Physical Activity:     Days of Exercise per Week:     Minutes of Exercise per Session:    Stress:     Feeling of Stress :    Social Connections:     Frequency of Communication with Friends and Family:     Frequency of Social Gatherings with Friends and Family:     Attends Sabianist Services:     Active Member of Clubs or Organizations:     Attends Club or Organization Meetings:     Marital Status:    Intimate Partner Violence:     Fear of Current or Ex-Partner:     Emotionally Abused:     Physically Abused:     Sexually Abused:          Family History         Family History   Problem Relation Age of Onset    Heart Disease Mother      Stroke Father      Breast Cancer Father      Other Sister           ALS    Heart Disease Brother           Current Medications:     Current Facility-Administered Medications          Current Outpatient Medications   Medication Sig Dispense Refill    acetaminophen (TYLENOL) 325 MG tablet Take 650 mg by mouth every 6 hours as needed for Pain          No current facility-administered medications for this visit. Allergies:   Codeine     Review of Systems:    Constitutional: No fever or chills.  No night sweats, no weight loss   Eyes: No eye discharge, double vision, or eye pain   HEENT: negative for sore mouth, sore throat, hoarseness and voice change   Respiratory: negative for cough , sputum, dyspnea, wheezing, hemoptysis, chest pain   Cardiovascular: negative for chest pain, dyspnea, palpitations, orthopnea, PND   Gastrointestinal: negative for nausea, vomiting, diarrhea, constipation, abdominal pain, Dysphagia, hematemesis and hematochezia   Genitourinary: negative for frequency, dysuria, nocturia, urinary incontinence, and hematuria   Integument: negative for rash, skin lesions, bruises. Hematologic/Lymphatic: negative for easy bruising, bleeding, lymphadenopathy, or petechiae   Endocrine: negative for heat or cold intolerance,weight changes, change in bowel habits and hair loss   Musculoskeletal: negative for myalgias, arthralgias, pain, joint swelling,and bone pain   Neurological: negative for headaches, dizziness, seizures, weakness, numbness. Positive memory loss           Physical Exam:     Vitals: /78   Pulse 103   Temp 96.8 °F (36 °C) (Temporal)   Wt 139 lb (63 kg)   BMI 22.44 kg/m²   General appearance - well appearing, no in pain or distress  Mental status - AAO X3  Eyes - pupils equal and reactive, extraocular eye movements intact  Mouth - mucous membranes moist, pharynx normal without lesions  Neck - supple, no significant adenopathy  Lymphatics - no palpable lymphadenopathy, no hepatosplenomegaly  Chest - clear to auscultation, no wheezes, rales or rhonchi, symmetric air entry  Heart - normal rate, regular rhythm, normal S1, S2, no murmurs  Abdomen - soft, nontender, nondistended, no masses or organomegaly.   urostomy bag in place  Neurological - alert, oriented, normal speech, no focal findings or movement disorder noted  Extremities - peripheral pulses normal, no pedal edema, no clubbing or cyanosis  Skin - normal coloration and turgor, no rashes, no suspicious skin lesions noted cystectomy with PLND and left nephroureterectomy with IC-1/4/2021  History of prostate cancer-2010. Status post radical prostatectomy-2010 and salvage EBRT-2015  CKD      Plan:  I had a detailed discussion with the patient and personally went over results of lab work-up imaging studies and other relevant clinical data. Reviewed records from outside facility  Reviewed results of CT PET  Reviewed images of CT PET with the patient  Reviewed goals and expectations. Patient is interested in pursuing further work-up. We will arrange for biopsy of one of the lung nodules  PSA was undetectable clinically doubt patient has recurrence of bladder cancer  We will continue to monitor kidney function  Patient has right-sided hydronephrosis. Patient follows up with urology. Discussed different scenarios. If patient is diagnosed with recurrent bladder cancer will consider NGS testing for actionable mutations as well as evaluation for PD-L1 status. NCCN guidelines were reviewed and discussed with the patient. The diagnosis and care plan were discussed with the patient in detail. I discussed the natural history of the disease, prognosis, risks and goals of therapy and answered all the patients questions to the best of my ability. Patient expressed understanding and was in agreement. Loy Cast MD       This note is created with the assistance of a speech recognition program.  While intending to generate a document that actually reflects the content of the visit, the document can still have some errors including those of syntax and sound a like substitutions which may escape proof reading. It such instances, actual meaning can be extrapolated by contextual diversion. never

## 2021-10-08 LAB — SURGICAL PATHOLOGY REPORT: NORMAL

## 2021-10-15 ENCOUNTER — TELEPHONE (OUTPATIENT)
Dept: ONCOLOGY | Age: 81
End: 2021-10-15

## 2021-10-15 ENCOUNTER — OFFICE VISIT (OUTPATIENT)
Dept: ONCOLOGY | Age: 81
End: 2021-10-15
Payer: MEDICARE

## 2021-10-15 VITALS
BODY MASS INDEX: 22.56 KG/M2 | HEART RATE: 112 BPM | TEMPERATURE: 96 F | RESPIRATION RATE: 16 BRPM | WEIGHT: 139.8 LBS | SYSTOLIC BLOOD PRESSURE: 135 MMHG | DIASTOLIC BLOOD PRESSURE: 84 MMHG

## 2021-10-15 DIAGNOSIS — Z85.46 HISTORY OF PROSTATE CANCER: ICD-10-CM

## 2021-10-15 DIAGNOSIS — R54 ADVANCED AGE: ICD-10-CM

## 2021-10-15 DIAGNOSIS — R91.8 LUNG NODULES: ICD-10-CM

## 2021-10-15 DIAGNOSIS — C67.9 MALIGNANT NEOPLASM OF URINARY BLADDER, UNSPECIFIED SITE (HCC): Primary | ICD-10-CM

## 2021-10-15 PROCEDURE — G8420 CALC BMI NORM PARAMETERS: HCPCS | Performed by: INTERNAL MEDICINE

## 2021-10-15 PROCEDURE — 99215 OFFICE O/P EST HI 40 MIN: CPT | Performed by: INTERNAL MEDICINE

## 2021-10-15 PROCEDURE — 1036F TOBACCO NON-USER: CPT | Performed by: INTERNAL MEDICINE

## 2021-10-15 PROCEDURE — 99211 OFF/OP EST MAY X REQ PHY/QHP: CPT | Performed by: INTERNAL MEDICINE

## 2021-10-15 PROCEDURE — G8484 FLU IMMUNIZE NO ADMIN: HCPCS | Performed by: INTERNAL MEDICINE

## 2021-10-15 PROCEDURE — 4040F PNEUMOC VAC/ADMIN/RCVD: CPT | Performed by: INTERNAL MEDICINE

## 2021-10-15 PROCEDURE — 1123F ACP DISCUSS/DSCN MKR DOCD: CPT | Performed by: INTERNAL MEDICINE

## 2021-10-15 PROCEDURE — G8427 DOCREV CUR MEDS BY ELIG CLIN: HCPCS | Performed by: INTERNAL MEDICINE

## 2021-10-15 NOTE — TELEPHONE ENCOUNTER
AVS from 10/15/21    Port per [employer] today  [facility]  rv c 1 d 1    Port placement scheduled 10/25/21    AVS given to  to follow precert     Pt will be coming Monday for [facility] draw on Monday 8a-2p    [facility] paperwork completed and given to md to sign and then forwarded to triage    RV to be scheduled with [facility]    AVS given to  to follow precert    PT was given AVS and an appt schedule    Electronically signed by Prema Gregg on 10/15/2021 at 2:08 PM

## 2021-10-15 NOTE — PROGRESS NOTES
Shira Matamoros                                                                                                                  10/15/2021  MRN:   [id number]  YOB: 1940  PCP:                           GABE Montes - CNP  Referring Physician: No ref. provider found  Treating Physician Name: Fredrick Hogan MD      Reason for visit:  Chief Complaint   Patient presents with    Follow-up     biopsy results    Patient presents to the clinic to discuss results of his biopsy. Current problems:  History of high-grade urothelial cancer, muscle invasive, pathological stage pT3b pN0 M0-10/2020  Lung metastasis from urothelial cancer, biopsy-proven-10/2021  History of prostate cancer-2010  CKD    Active and recent treatments:  Status post radical prostatectomy-2010 and salvage EBRT-2015  Status post radical robotic cystectomy with PLND and left nephroureterectomy with IC-1/4/2021    Interim History:    Patient presents to the clinic for a follow-up visit and to discuss further treatment plan he is accompanied by his niece. Biopsy came back positive for urothelial cancer. Patient continues to live in assisted living. Weight is stable. Continues to be active. Denies any pain. Denies any shortness of breath. Denies any hemoptysis    During this visit patient's allergy, social, medical, surgical history and medications were reviewed and updated. Summary of Case/History:    Shira Matamoros a 79 y.o.male is a patient with history of hypertension prostate cancer status post prostatectomy in 2010 and salvage radiation therapy several years after was recently diagnosed with muscle invasive bladder cancer. Patient initially presented to outside hospital with complaints of flank pain and constipation. Imaging studies showed left hydronephrosis.   Patient underwent cystoscopy on 10/2020 which showed a bladder tumor replacing left ureteral orifice and small tumors on the right and left lateral bladder activity: None   Other Topics Concern    None   Social History Narrative    None     Social Determinants of Health     Financial Resource Strain:     Difficulty of Paying Living Expenses:    Food Insecurity:     Worried About Running Out of Food in the Last Year:     [address] in the Last Year:    Transportation Needs:     Lack of Transportation (Medical):  Lack of Transportation (Non-Medical):    Physical Activity:     Days of Exercise per Week:     Minutes of Exercise per Session:    Stress:     Feeling of Stress :    Social Connections:     Frequency of Communication with Friends and Family:     Frequency of Social Gatherings with Friends and Family:     Attends Holiness Services:     Active Member of Clubs or Organizations:     Attends Club or Organization Meetings:     Marital Status:    Intimate Partner Violence:     Fear of Current or Ex-Partner:     Emotionally Abused:     Physically Abused:     Sexually Abused:      Family History   Problem Relation Age of Onset    Heart Disease Mother     Stroke Father     Breast Cancer Father     Other Sister         ALS    Heart Disease Brother      Current Medications:     Current Outpatient Medications   Medication Sig Dispense Refill    acetaminophen (TYLENOL) 325 MG tablet Take 650 mg by mouth every 6 hours as needed for Pain       No current facility-administered medications for this visit. Allergies:   Codeine    Review of Systems:    Constitutional: No fever or chills.  No night sweats, no weight loss   Eyes: No eye discharge, double vision, or eye pain   HEENT: negative for sore mouth, sore throat, hoarseness and voice change   Respiratory: negative for cough , sputum, dyspnea, wheezing, hemoptysis, chest pain   Cardiovascular: negative for chest pain, dyspnea, palpitations, orthopnea, PND   Gastrointestinal: negative for nausea, vomiting, diarrhea, constipation, abdominal pain, Dysphagia, hematemesis and hematochezia Genitourinary: negative for frequency, dysuria, nocturia, urinary incontinence, and hematuria   Integument: negative for rash, skin lesions, bruises. Hematologic/Lymphatic: negative for easy bruising, bleeding, lymphadenopathy, or petechiae   Endocrine: negative for heat or cold intolerance,weight changes, change in bowel habits and hair loss   Musculoskeletal: negative for myalgias, arthralgias, pain, joint swelling,and bone pain   Neurological: negative for headaches, dizziness, seizures, weakness, numbness. Positive memory loss        Physical Exam:    Vitals: /84   Pulse 112   Temp 96 °F (35.6 °C) (Temporal)   Resp 16   Wt 139 lb 12.8 oz (63.4 kg)   BMI 22.56 kg/m²   General appearance - well appearing, no in pain or distress  Mental status - AAO X3  Eyes - pupils equal and reactive, extraocular eye movements intact  Mouth - mucous membranes moist, pharynx normal without lesions  Neck - supple, no significant adenopathy  Lymphatics - no palpable lymphadenopathy, no hepatosplenomegaly  Chest - clear to auscultation, no wheezes, rales or rhonchi, symmetric air entry  Heart - normal rate, regular rhythm, normal S1, S2, no murmurs  Abdomen - soft, nontender, nondistended, no masses or organomegaly.   urostomy bag in place  Neurological - alert, oriented, normal speech, no focal findings or movement disorder noted  Extremities - peripheral pulses normal, no pedal edema, no clubbing or cyanosis  Skin - normal coloration and turgor, no rashes, no suspicious skin lesions noted       DATA:    SYNOPTIC REPORT FOR BLADDER CARCINOMA:  Cystectomy (p=partial, r=radical, rc=radical cystoprostatectomy): R  Neoadjuvant treatment (y=yes, n=no): N  Invasive/papillary tumor location (r=right lateral, l=left lateral,   a=anterior, p=posterior, d=dome, t=trigone, u=ureter): U, L, T, P  Invasive/papillary tumor size (cm) (na=not applicable): 2.7 CM  Histologic type: UROTHELIAL  Grade (Urothelial: l=low, h=high; Adeno/Squamous cell: 1-3): H  Lymphovascular invasion (n=no, y=yes): Y     Tumor extent (n=no, y=yes, na=not applicable): Invasion of lamina propria: Y  Invasion of inner half of muscularis propria: Y  Invasion of outer half of muscularis propria: Y  Microscopic invasion of perivesicular adipose: Y  Macroscopic invasion of perivesicular adipose: Y  Prostate stroma, seminal vesicle, uterus, vagina invasion: N  In situ tumor in urethra or prostatic ducts: N  Invasion of pelvic wall or abdominal wall: NA     Margins (n=negative, p=positive, na=not applicable): N  Location of positive margin: NA     Regional lymph nodes (n=no, y=yes, na=not applicable):  Number examined: 28  Number positive: 0  Extranodal extension: NA    Additional findings (ex: invasion of ovary/fallopian tube/bowel): PERINEURAL INVASION  pTNM: pT3b N0  TURBT (10/2/20) by Dr. Juanjose Muñiz     Imaging Studies  CT A/P 7/15/21:  Severe right hydroureter ureter nephrosis despite ureteral stent. * Nonobstructing right renal calculi. * No definitive evidence of malignancy. CT chest 7/15/21:  Multiple bilateral pulmonary nodules largest measures 1 cm in the right upper lobe and right lower lobe.  These could be benign or malignant.  Comparison to old examination from outside institution suggested.  If these are not available, a PET scan could be obtained. Impression:  History of high-grade urothelial cancer, muscle invasive, pathological stage pT3b pN0 M0-10/2010. Status post radical robotic cystectomy with PLND and left nephroureterectomy with IC-1/4/2021  Pulmonary metastasis, biopsy-proven-10/2021  Anticipating treatment with Keytruda  History of prostate cancer-2010. Status post radical prostatectomy-2010 and salvage EBRT-2015  CKD     Plan:  I had a detailed discussion with the patient and personally went over results of lab work-up imaging studies and other relevant clinical data.   Discuss results of biopsy which is consistent with urothelial cancer. Reviewed goals and expectations. Given CKD patient not a candidate for cisplatin. Reviewed goals and expectations. Discussed treatment options including systemic palliative therapy as well as the supportive care. Patient understands his disease not curable and aims of treatment would be to control disease improve quality of life and prolong life  I am concerned about patient's ability to tolerate cytotoxic therapy  After detailed discussion patient expressed his wishes to proceed with immunotherapy. I will order for Keytruda. We will also order NGS  Reviewed potential side effects of Keytruda reviewed logistics. Continue surveillance for prostate cancer  Patient has right-sided hydronephrosis. Patient follows up with urology. NCCN guidelines were reviewed and discussed with the patient. The diagnosis and care plan were discussed with the patient in detail. I discussed the natural history of the disease, prognosis, risks and goals of therapy and answered all the patients questions to the best of my ability. Patient expressed understanding and was in agreement. Dana Aguero MD      I spent more than 40 minutes examining, evaluating, reviewing data, counseling the patient and coordinating care. Greater than 50% of time was spent face-to-face with the patient this note is created with the assistance of a speech recognition program.  While intending to generate a document that actually reflects the content of the visit, the document can still have some errors including those of syntax and sound a like substitutions which may escape proof reading. It such instances, actual meaning can be extrapolated by contextual diversion.

## 2021-10-19 ENCOUNTER — TELEPHONE (OUTPATIENT)
Dept: INFUSION THERAPY | Age: 81
End: 2021-10-19

## 2021-10-19 ENCOUNTER — TELEPHONE (OUTPATIENT)
Dept: CASE MANAGEMENT | Age: 81
End: 2021-10-19

## 2021-10-19 ENCOUNTER — HOSPITAL ENCOUNTER (OUTPATIENT)
Age: 81
Discharge: HOME | End: 2021-10-19
Payer: MEDICARE

## 2021-10-19 DIAGNOSIS — Z85.46 HISTORY OF PROSTATE CANCER: ICD-10-CM

## 2021-10-19 DIAGNOSIS — C67.9 MALIGNANT NEOPLASM OF URINARY BLADDER, UNSPECIFIED SITE (HCC): ICD-10-CM

## 2021-10-19 DIAGNOSIS — R54 ADVANCED AGE: ICD-10-CM

## 2021-10-19 DIAGNOSIS — R91.8 LUNG NODULES: ICD-10-CM

## 2021-10-19 LAB
ABSOLUTE EOS #: 0.2 K/UL (ref 0–0.4)
ABSOLUTE IMMATURE GRANULOCYTE: ABNORMAL K/UL (ref 0–0.3)
ABSOLUTE LYMPH #: 0.7 K/UL (ref 1–4.8)
ABSOLUTE MONO #: 1.1 K/UL (ref 0.1–1.2)
ALBUMIN SERPL-MCNC: 3.7 G/DL (ref 3.5–5.2)
ALBUMIN/GLOBULIN RATIO: 1.2 (ref 1–2.5)
ALP BLD-CCNC: 147 U/L (ref 40–129)
ALT SERPL-CCNC: 18 U/L (ref 5–41)
ANION GAP SERPL CALCULATED.3IONS-SCNC: 11 MMOL/L (ref 9–17)
AST SERPL-CCNC: 15 U/L
BASOPHILS # BLD: 1 % (ref 0–2)
BASOPHILS ABSOLUTE: 0 K/UL (ref 0–0.2)
BILIRUB SERPL-MCNC: 0.33 MG/DL (ref 0.3–1.2)
BUN BLDV-MCNC: 39 MG/DL (ref 8–23)
BUN/CREAT BLD: ABNORMAL (ref 9–20)
CALCIUM SERPL-MCNC: 10.7 MG/DL (ref 8.6–10.4)
CHLORIDE BLD-SCNC: 109 MMOL/L (ref 98–107)
CO2: 19 MMOL/L (ref 20–31)
CREAT SERPL-MCNC: 1.5 MG/DL (ref 0.7–1.2)
DIFFERENTIAL TYPE: ABNORMAL
EOSINOPHILS RELATIVE PERCENT: 3 % (ref 1–4)
GFR AFRICAN AMERICAN: 54 ML/MIN
GFR NON-AFRICAN AMERICAN: 45 ML/MIN
GFR SERPL CREATININE-BSD FRML MDRD: ABNORMAL ML/MIN/{1.73_M2}
GFR SERPL CREATININE-BSD FRML MDRD: ABNORMAL ML/MIN/{1.73_M2}
GLUCOSE BLD-MCNC: 103 MG/DL (ref 70–99)
HCT VFR BLD CALC: 45.2 % (ref 41–53)
HEMOGLOBIN: 14.9 G/DL (ref 13.5–17.5)
IMMATURE GRANULOCYTES: ABNORMAL %
LYMPHOCYTES # BLD: 8 % (ref 24–44)
MCH RBC QN AUTO: 29.3 PG (ref 26–34)
MCHC RBC AUTO-ENTMCNC: 32.9 G/DL (ref 31–37)
MCV RBC AUTO: 89 FL (ref 80–100)
MONOCYTES # BLD: 12 % (ref 2–11)
NRBC AUTOMATED: ABNORMAL PER 100 WBC
PDW BLD-RTO: 15.7 % (ref 12.5–15.4)
PLATELET # BLD: 298 K/UL (ref 140–450)
PLATELET ESTIMATE: ABNORMAL
PMV BLD AUTO: 7.1 FL (ref 6–12)
POTASSIUM SERPL-SCNC: 5 MMOL/L (ref 3.7–5.3)
RBC # BLD: 5.08 M/UL (ref 4.5–5.9)
RBC # BLD: ABNORMAL 10*6/UL
SEG NEUTROPHILS: 76 % (ref 36–66)
SEGMENTED NEUTROPHILS ABSOLUTE COUNT: 6.9 K/UL (ref 1.8–7.7)
SODIUM BLD-SCNC: 139 MMOL/L (ref 135–144)
TOTAL PROTEIN: 6.7 G/DL (ref 6.4–8.3)
WBC # BLD: 9 K/UL (ref 3.5–11)
WBC # BLD: ABNORMAL 10*3/UL

## 2021-10-19 PROCEDURE — 80053 COMPREHEN METABOLIC PANEL: CPT

## 2021-10-19 PROCEDURE — 36415 COLL VENOUS BLD VENIPUNCTURE: CPT

## 2021-10-19 PROCEDURE — 85025 COMPLETE CBC W/AUTO DIFF WBC: CPT

## 2021-10-19 NOTE — TELEPHONE ENCOUNTER
Name: Trista Lopez  : 1940  MRN: [id number]    Oncology Navigation Follow-Up Note  Navigator received call from pts. Ramon Berman and talked at length about pts. Dementia and is desires as far as treatment is concerned. Pt. Wanting to proceed, but Anahi unsure if pt. Is understanding of the effects treatment could have on him and his quality of life? At the same time Anahi not wanting to discourage pt. From receiving treatment . Pt. May not be cognizant of all that treatment entails , but wanting navigation opinion. Writer sharing with ramon would allow pt. To proceed and if once treatment is initiated pt. May change his mind and that is okay too.    Electronically signed by Felicitas Hunter RN on 10/19/2021 at 4:49 PM

## 2021-10-19 NOTE — TELEPHONE ENCOUNTER
[facility] orders obtained   Faxed demographics, prog note, insurance card and path report to [facility] with confirmation   Mikey Gomez at [facility] RN

## 2021-10-20 LAB
SEND OUT REPORT: NORMAL
TEST NAME: NORMAL

## 2021-10-22 ENCOUNTER — TELEPHONE (OUTPATIENT)
Dept: INFUSION THERAPY | Age: 81
End: 2021-10-22

## 2021-10-22 DIAGNOSIS — C67.9 MALIGNANT NEOPLASM OF URINARY BLADDER, UNSPECIFIED SITE (HCC): Primary | ICD-10-CM

## 2021-10-22 RX ORDER — SODIUM CHLORIDE 9 MG/ML
25 INJECTION, SOLUTION INTRAVENOUS PRN
Status: CANCELLED | OUTPATIENT
Start: 2021-10-22

## 2021-10-22 RX ORDER — SODIUM CHLORIDE 0.9 % (FLUSH) 0.9 %
5-40 SYRINGE (ML) INJECTION PRN
Status: CANCELLED | OUTPATIENT
Start: 2021-10-22

## 2021-10-22 NOTE — TELEPHONE ENCOUNTER
Chemotherapy orders received:    Ht=66 inches  Wv=164 lbs  BSA=1.71  Chemotherapy doses verified:  Keytruda 200 mg flat dose   Bettina Chakraborty RN

## 2021-10-25 ENCOUNTER — HOSPITAL ENCOUNTER (OUTPATIENT)
Dept: INTERVENTIONAL RADIOLOGY/VASCULAR | Age: 81
Discharge: HOME | End: 2021-10-27
Payer: MEDICARE

## 2021-10-25 VITALS
WEIGHT: 138.45 LBS | RESPIRATION RATE: 16 BRPM | TEMPERATURE: 97.3 F | OXYGEN SATURATION: 97 % | SYSTOLIC BLOOD PRESSURE: 117 MMHG | HEIGHT: 66 IN | DIASTOLIC BLOOD PRESSURE: 69 MMHG | HEART RATE: 83 BPM | BODY MASS INDEX: 22.25 KG/M2

## 2021-10-25 DIAGNOSIS — D49.4 BLADDER NEOPLASM: ICD-10-CM

## 2021-10-25 LAB
INR BLD: 1
PARTIAL THROMBOPLASTIN TIME: 40.4 SEC (ref 23.9–33.8)
PLATELET # BLD: 324 K/UL (ref 138–453)
PROTHROMBIN TIME: 12.8 SEC (ref 11.5–14.2)

## 2021-10-25 PROCEDURE — 85610 PROTHROMBIN TIME: CPT

## 2021-10-25 PROCEDURE — 85049 AUTOMATED PLATELET COUNT: CPT

## 2021-10-25 PROCEDURE — 6360000002 HC RX W HCPCS: Performed by: RADIOLOGY

## 2021-10-25 PROCEDURE — 2580000003 HC RX 258: Performed by: RADIOLOGY

## 2021-10-25 PROCEDURE — 7100000010 HC PHASE II RECOVERY - FIRST 15 MIN

## 2021-10-25 PROCEDURE — 36561 INSERT TUNNELED CV CATH: CPT

## 2021-10-25 PROCEDURE — 76937 US GUIDE VASCULAR ACCESS: CPT

## 2021-10-25 PROCEDURE — 85730 THROMBOPLASTIN TIME PARTIAL: CPT

## 2021-10-25 PROCEDURE — 99152 MOD SED SAME PHYS/QHP 5/>YRS: CPT

## 2021-10-25 PROCEDURE — 2500000003 HC RX 250 WO HCPCS: Performed by: RADIOLOGY

## 2021-10-25 PROCEDURE — 77001 FLUOROGUIDE FOR VEIN DEVICE: CPT

## 2021-10-25 PROCEDURE — 99153 MOD SED SAME PHYS/QHP EA: CPT

## 2021-10-25 PROCEDURE — C1894 INTRO/SHEATH, NON-LASER: HCPCS

## 2021-10-25 PROCEDURE — 7100000011 HC PHASE II RECOVERY - ADDTL 15 MIN

## 2021-10-25 RX ORDER — LIDOCAINE HYDROCHLORIDE 10 MG/ML
INJECTION, SOLUTION INFILTRATION; PERINEURAL
Status: COMPLETED | OUTPATIENT
Start: 2021-10-25 | End: 2021-10-25

## 2021-10-25 RX ORDER — SODIUM CHLORIDE 0.9 % (FLUSH) 0.9 %
5-40 SYRINGE (ML) INJECTION PRN
Status: DISCONTINUED | OUTPATIENT
Start: 2021-10-25 | End: 2021-10-28 | Stop reason: HOSPADM

## 2021-10-25 RX ORDER — MIDAZOLAM HYDROCHLORIDE 1 MG/ML
INJECTION INTRAMUSCULAR; INTRAVENOUS
Status: COMPLETED | OUTPATIENT
Start: 2021-10-25 | End: 2021-10-25

## 2021-10-25 RX ORDER — SODIUM CHLORIDE 9 MG/ML
25 INJECTION, SOLUTION INTRAVENOUS PRN
Status: DISCONTINUED | OUTPATIENT
Start: 2021-10-25 | End: 2021-10-28 | Stop reason: HOSPADM

## 2021-10-25 RX ORDER — HEPARIN SODIUM (PORCINE) LOCK FLUSH IV SOLN 100 UNIT/ML 100 UNIT/ML
SOLUTION INTRAVENOUS
Status: COMPLETED | OUTPATIENT
Start: 2021-10-25 | End: 2021-10-25

## 2021-10-25 RX ORDER — ACETAMINOPHEN 325 MG/1
650 TABLET ORAL EVERY 4 HOURS PRN
Status: DISCONTINUED | OUTPATIENT
Start: 2021-10-25 | End: 2021-10-28 | Stop reason: HOSPADM

## 2021-10-25 RX ORDER — FENTANYL CITRATE 50 UG/ML
INJECTION, SOLUTION INTRAMUSCULAR; INTRAVENOUS
Status: COMPLETED | OUTPATIENT
Start: 2021-10-25 | End: 2021-10-25

## 2021-10-25 RX ADMIN — MIDAZOLAM 1 MG: 1 INJECTION INTRAMUSCULAR; INTRAVENOUS at 11:55

## 2021-10-25 RX ADMIN — Medication 50 MCG: at 11:55

## 2021-10-25 RX ADMIN — CEFAZOLIN 1000 MG: 1 INJECTION, POWDER, FOR SOLUTION INTRAMUSCULAR; INTRAVENOUS at 11:32

## 2021-10-25 RX ADMIN — LIDOCAINE HYDROCHLORIDE 5 ML: 10 INJECTION, SOLUTION INFILTRATION; PERINEURAL at 11:55

## 2021-10-25 RX ADMIN — SODIUM CHLORIDE 25 ML: 9 INJECTION, SOLUTION INTRAVENOUS at 09:47

## 2021-10-25 RX ADMIN — Medication 500 UNITS: at 12:20

## 2021-10-25 ASSESSMENT — PAIN SCALES - GENERAL
PAINLEVEL_OUTOF10: 0

## 2021-10-25 ASSESSMENT — PAIN - FUNCTIONAL ASSESSMENT: PAIN_FUNCTIONAL_ASSESSMENT: 0-10

## 2021-10-25 NOTE — PRE SEDATION
Sedation Pre-Procedure Note    Patient Name: Hailey Sheehan   YOB: 1940  Room/Bed: Room/bed info not found  Medical Record Number: [id number]  Date: 10/25/2021   Time: 11:41 AM       Indication:  Urothelial CA    Consent: I have discussed with the patient and/or the patient representative the indication, alternatives, and the possible risks and/or complications of the planned procedure and the anesthesia methods. The patient and/or patient representative appear to understand and agree to proceed. Vital Signs:   Vitals:    10/25/21 0930   BP: 126/77   Pulse: 88   Resp: 16   Temp: 96.9 °F (36.1 °C)   SpO2: 100%       Past Medical History:   has a past medical history of Bladder cancer ([facility] [address] Chronic kidney disease, and Hypertension. Past Surgical History:   has a past surgical history that includes Bladder removal; Kidney removal (Left); Prostate surgery; Knee Arthroplasty; Rotator cuff repair (Right); Lung biopsy (10/06/2021); and CT NEEDLE BIOPSY LUNG PERCUTANEOUS (10/6/2021). Medications:   Scheduled Meds:    ceFAZolin  1,000 mg IntraVENous On Call to OR     Continuous Infusions:    sodium chloride 25 mL (10/25/21 0933)     PRN Meds: sodium chloride, sodium chloride flush  Home Meds:   Prior to Admission medications    Medication Sig Start Date End Date Taking? Authorizing Provider   acetaminophen (TYLENOL) 325 MG tablet Take 650 mg by mouth every 6 hours as needed for Pain   Yes Historical Provider, MD     Coumadin Use Last 7 Days:  no  Antiplatelet drug therapy use last 7 days: no  Other anticoagulant use last 7 days: no  Additional Medication Information:  None      Pre-Sedation Documentation and Exam:   Vital signs have been reviewed (see flow sheet for vitals). I have reviewed the patient's history and review of systems. Lungs: clear, Cardiovascular: regular rate and rhythm.     Mallampati Airway Assessment:  Mallampati Class I - (soft palate, fauces, uvula & anterior/posterior tonsillar pillars are visible)    Prior History of Anesthesia Complications:   none    ASA Classification:  Class 2 - A normal healthy patient with mild systemic disease    Sedation/ Anesthesia Plan:   intravenous sedation    Medications Planned:   midazolam (Versed) intravenously and fentanyl intravenously    Patient is an appropriate candidate for plan of sedation: yes    Electronically signed by Mg Duran MD on 10/25/2021 at 11:41 AM

## 2021-10-25 NOTE — BRIEF OP NOTE
Brief Postoperative Note    Ye Joseph  YOB: 1940  [id number]    Pre-operative Diagnosis: Urothelial Cancer    Post-operative Diagnosis: Same    Procedure: Mediport placement    Anesthesia: Moderate Sedation    Surgeons/Assistants: Dr. Kirill Dutta    Estimated Blood Loss: less than 50     Complications: None    Specimens: Was Not Obtained    Findings: Successful mediport placement, RIJ. Port may be used immediately.     Electronically signed by Nery Chaidez MD on 10/25/2021 at 12:38 PM

## 2021-10-25 NOTE — H&P
Labs:  Recent Labs     10/19/21  0840 10/06/21  0931 10/06/21  0931   HGB 14.9  --   --    HCT 45.2  --   --    WBC 9.0  --   --    MCV 89.0  --   --       < > 276     --   --    K 5.0  --   --    *  --   --    CO2 19*  --   --    BUN 39*  --   --    CREATININE 1.50*  --   --    GLUCOSE 103*  --   --    INR  --   --  1.0   PROTIME  --   --  13.0   APTT  --   --  26.1   AST 15  --   --    ALT 18  --   --    LABALBU 3.7  --   --     < > = values in this interval not displayed. No results for input(s): COVID19 in the last 720 hours. GABE Bailey CNP   Electronically signed 10/25/2021 at 9:43 Eddy Gregory MD   Physician   Specialty:  Hematology and Oncology   Progress Notes      Signed   Encounter Date:  10/15/2021         Related encounter: Office Visit from 10/15/2021 in [facility] all  [x]Manual[x]Template[x]Copied    Added by:  Tasha Spain MD    []Armida for details             Neal Greenwood                                                                                                                  10/15/2021  MRN:                           [id number]  YOB: 1940  PCP:                           GABE Talley CNP  Referring Physician: No ref. provider found  Treating Physician Name: Conner Sanford MD        Reason for visit:       Chief Complaint   Patient presents with    Follow-up       biopsy results    Patient presents to the clinic to discuss results of his biopsy.      Current problems:  History of high-grade urothelial cancer, muscle invasive, pathological stage pT3b pN0 M0-10/2020  Lung metastasis from urothelial cancer, biopsy-proven-10/2021  History of prostate cancer-2010  CKD     Active and recent treatments:  Status post radical prostatectomy-2010 and salvage EBRT-2015  Status post radical robotic cystectomy with PLND and left nephroureterectomy with IC-1/4/2021     Interim History:     Patient presents to the clinic for a follow-up visit and to discuss further treatment plan he is accompanied by his niece. Biopsy came back positive for urothelial cancer. Patient continues to live in assisted living. Weight is stable. Continues to be active. Denies any pain. Denies any shortness of breath. Denies any hemoptysis     During this visit patient's allergy, social, medical, surgical history and medications were reviewed and updated. Summary of Case/History:     Palma Brennan a 79 y.o.male is a patient with history of hypertension prostate cancer status post prostatectomy in 2010 and salvage radiation therapy several years after was recently diagnosed with muscle invasive bladder cancer. Patient initially presented to outside hospital with complaints of flank pain and constipation. Imaging studies showed left hydronephrosis. Patient underwent cystoscopy on 10/2020 which showed a bladder tumor replacing left ureteral orifice and small tumors on the right and left lateral bladder walls. Patient underwent TURBT on 10/2/2020. Pathology revealed high-grade urothelial cancer muscle invasive. Patient underwent CT PET for staging which was negative for any distant metastasis. Patient underwent radical robotic cystectomy with PLND and left nephroureterectomy in January 2021. Patient post surgical recovery was slow and patient was in and out of the hospital and rehab due to failure to thrive. More recently patient has moved to [place] to be closer to family, his niece. Patient is currently residing in assisted living and is now doing better. Patient does admit that he has been having memory loss and it was getting difficult for him to take care of himself at home. Patient recently had CT scans done which showed lung nodules concerning for malignancy.   Patient is now establishing care in [place] and was seen by urology who referred patient to medical oncology. Patient at the time of evaluation denies any bloody urine. He has a urostomy bag. Denies shortness of breath. Denies any pain. Past Medical History:   Past Medical History        Past Medical History:   Diagnosis Date    Bladder cancer ([address]      Chronic kidney disease      Hypertension              Past Surgical History:     Past Surgical History         Past Surgical History:   Procedure Laterality Date    BLADDER REMOVAL        CT NEEDLE BIOPSY LUNG PERCUTANEOUS   10/6/2021     CT NEEDLE BIOPSY LUNG PERCUTANEOUS 10/6/2021 [facility] CT SCAN    KIDNEY REMOVAL Left      KNEE ARTHROPLASTY        LUNG BIOPSY   10/06/2021    PROSTATE SURGERY        ROTATOR CUFF REPAIR Right              Patient Family Social History:     Social History   Social History            Socioeconomic History    Marital status: Single       Spouse name: None    Number of children: None    Years of education: None    Highest education level: None   Occupational History    None   Tobacco Use    Smoking status: Never Smoker    Smokeless tobacco: Never Used   Substance and Sexual Activity    Alcohol use: Not Currently    Drug use: Never    Sexual activity: None   Other Topics Concern    None   Social History Narrative    None      Social Determinants of Health          Financial Resource Strain:     Difficulty of Paying Living Expenses:    Food Insecurity:     Worried About Running Out of Food in the Last Year:     Ran Out of Food in the Last Year:    Transportation Needs:     Lack of Transportation (Medical):      Lack of Transportation (Non-Medical):    Physical Activity:     Days of Exercise per Week:     Minutes of Exercise per Session:    Stress:     Feeling of Stress :    Social Connections:     Frequency of Communication with Friends and Family:     Frequency of Social Gatherings with Friends and Family:     Attends [employer] Services:     Active Member of Clubs or Organizations:     Attends Club or Organization Meetings:     Marital Status:    Intimate Partner Violence:     Fear of Current or Ex-Partner:     Emotionally Abused:     Physically Abused:     Sexually Abused:          Family History         Family History   Problem Relation Age of Onset    Heart Disease Mother      Stroke Father      Breast Cancer Father      Other Sister           ALS    Heart Disease Brother           Current Medications:     Current Facility-Administered Medications          Current Outpatient Medications   Medication Sig Dispense Refill    acetaminophen (TYLENOL) 325 MG tablet Take 650 mg by mouth every 6 hours as needed for Pain          No current facility-administered medications for this visit. Allergies:   Codeine     Review of Systems:    Constitutional: No fever or chills. No night sweats, no weight loss   Eyes: No eye discharge, double vision, or eye pain   HEENT: negative for sore mouth, sore throat, hoarseness and voice change   Respiratory: negative for cough , sputum, dyspnea, wheezing, hemoptysis, chest pain   Cardiovascular: negative for chest pain, dyspnea, palpitations, orthopnea, PND   Gastrointestinal: negative for nausea, vomiting, diarrhea, constipation, abdominal pain, Dysphagia, hematemesis and hematochezia   Genitourinary: negative for frequency, dysuria, nocturia, urinary incontinence, and hematuria   Integument: negative for rash, skin lesions, bruises. Hematologic/Lymphatic: negative for easy bruising, bleeding, lymphadenopathy, or petechiae   Endocrine: negative for heat or cold intolerance,weight changes, change in bowel habits and hair loss   Musculoskeletal: negative for myalgias, arthralgias, pain, joint swelling,and bone pain   Neurological: negative for headaches, dizziness, seizures, weakness, numbness.   Positive memory loss           Physical Exam:     Vitals: /84   Pulse 112   Temp 96 °F (35.6 °C) (Temporal)   Resp 16   Wt 139 lb 12.8 oz (63.4 kg)   BMI 22.56 kg/m²   General appearance - well appearing, no in pain or distress  Mental status - AAO X3  Eyes - pupils equal and reactive, extraocular eye movements intact  Mouth - mucous membranes moist, pharynx normal without lesions  Neck - supple, no significant adenopathy  Lymphatics - no palpable lymphadenopathy, no hepatosplenomegaly  Chest - clear to auscultation, no wheezes, rales or rhonchi, symmetric air entry  Heart - normal rate, regular rhythm, normal S1, S2, no murmurs  Abdomen - soft, nontender, nondistended, no masses or organomegaly. urostomy bag in place  Neurological - alert, oriented, normal speech, no focal findings or movement disorder noted  Extremities - peripheral pulses normal, no pedal edema, no clubbing or cyanosis  Skin - normal coloration and turgor, no rashes, no suspicious skin lesions noted         DATA:     SYNOPTIC REPORT FOR BLADDER CARCINOMA:  Cystectomy (p=partial, r=radical, rc=radical cystoprostatectomy): R  Neoadjuvant treatment (y=yes, n=no): N  Invasive/papillary tumor location (r=right lateral, l=left lateral,   a=anterior, p=posterior, d=dome, t=trigone, u=ureter): U, L, T, P  Invasive/papillary tumor size (cm) (na=not applicable): 2.7 CM  Histologic type: UROTHELIAL  Grade (Urothelial: l=low, h=high; Adeno/Squamous cell: 1-3): H  Lymphovascular invasion (n=no, y=yes): Y     Tumor extent (n=no, y=yes, na=not applicable): Invasion of lamina propria: Y  Invasion of inner half of muscularis propria: Y  Invasion of outer half of muscularis propria: Y  Microscopic invasion of perivesicular adipose: Y  Macroscopic invasion of perivesicular adipose:  Y  Prostate stroma, seminal vesicle, uterus, vagina invasion: N  In situ tumor in urethra or prostatic ducts: N  Invasion of pelvic wall or abdominal wall: NA     Margins (n=negative, p=positive, na=not applicable): N  Location of positive margin: NA     Regional lymph nodes (n=no, y=yes, na=not applicable):  Number examined: 28  Number positive: 0  Extranodal extension: NA    Additional findings (ex: invasion of ovary/fallopian tube/bowel): PERINEURAL INVASION  pTNM: pT3b N0  TURBT (10/2/20) by Dr. Eliz Cantu      Imaging Studies  CT A/P 7/15/21:  Severe right hydroureter ureter nephrosis despite ureteral stent. * Nonobstructing right renal calculi. * No definitive evidence of malignancy. CT chest 7/15/21:  Multiple bilateral pulmonary nodules largest measures 1 cm in the right upper lobe and right lower lobe. These could be benign or malignant. Comparison to old examination from outside institution suggested. If these are not available, a PET scan could be obtained. Impression:  History of high-grade urothelial cancer, muscle invasive, pathological stage pT3b pN0 M0-10/2010. Status post radical robotic cystectomy with PLND and left nephroureterectomy with IC-1/4/2021  Pulmonary metastasis, biopsy-proven-10/2021  Anticipating treatment with Keytruda  History of prostate cancer-2010. Status post radical prostatectomy-2010 and salvage EBRT-2015  CKD      Plan:  I had a detailed discussion with the patient and personally went over results of lab work-up imaging studies and other relevant clinical data. Discuss results of biopsy which is consistent with urothelial cancer. Reviewed goals and expectations. Given CKD patient not a candidate for cisplatin. Reviewed goals and expectations. Discussed treatment options including systemic palliative therapy as well as the supportive care. Patient understands his disease not curable and aims of treatment would be to control disease improve quality of life and prolong life  I am concerned about patient's ability to tolerate cytotoxic therapy  After detailed discussion patient expressed his wishes to proceed with immunotherapy. I will order for Keytruda.   We will also order NGS  Reviewed potential side effects of Keytruda reviewed logistics. Continue surveillance for prostate cancer  Patient has right-sided hydronephrosis. Patient follows up with urology. NCCN guidelines were reviewed and discussed with the patient. The diagnosis and care plan were discussed with the patient in detail. I discussed the natural history of the disease, prognosis, risks and goals of therapy and answered all the patients questions to the best of my ability. Patient expressed understanding and was in agreement. Ulisses Rice MD       I spent more than 40 minutes examining, evaluating, reviewing data, counseling the patient and coordinating care. Greater than 50% of time was spent face-to-face with the patient this note is created with the assistance of a speech recognition program.  While intending to generate a document that actually reflects the content of the visit, the document can still have some errors including those of syntax and sound a like substitutions which may escape proof reading. It such instances, actual meaning can be extrapolated by contextual diversion.

## 2021-10-26 ENCOUNTER — OFFICE VISIT (OUTPATIENT)
Dept: ONCOLOGY | Age: 81
End: 2021-10-26
Payer: MEDICARE

## 2021-10-26 DIAGNOSIS — C67.9 MALIGNANT NEOPLASM OF URINARY BLADDER, UNSPECIFIED SITE (HCC): Primary | ICD-10-CM

## 2021-10-26 PROCEDURE — 99999 PR OFFICE/OUTPT VISIT,PROCEDURE ONLY: CPT | Performed by: INTERNAL MEDICINE

## 2021-10-26 NOTE — PROGRESS NOTES
Nivia Gray and his niecehere for chemotherapy teaching. Spent 60 minutes with them   Teaching sheets from [facility] and verbal information given on chemotherapy agents, action, administration and side effects. Provided books chemotherapy and you and Eating Hints: Before During, and After Cancer treatment.   Handout about unit with phone numbers for [facility]      Jose discussed: Elisabeth Pimentel     Discussed implanted port and demonstrated, port placed yesterday, area pink, slight tender        Questions answered, support given

## 2021-10-27 ENCOUNTER — TELEPHONE (OUTPATIENT)
Dept: CASE MANAGEMENT | Age: 81
End: 2021-10-27

## 2021-10-27 ENCOUNTER — HOSPITAL ENCOUNTER (OUTPATIENT)
Dept: INFUSION THERAPY | Age: 81
Discharge: HOME | End: 2021-10-27
Payer: MEDICARE

## 2021-10-27 ENCOUNTER — OFFICE VISIT (OUTPATIENT)
Dept: ONCOLOGY | Age: 81
End: 2021-10-27
Payer: MEDICARE

## 2021-10-27 ENCOUNTER — TELEPHONE (OUTPATIENT)
Dept: ONCOLOGY | Age: 81
End: 2021-10-27

## 2021-10-27 VITALS
DIASTOLIC BLOOD PRESSURE: 69 MMHG | BODY MASS INDEX: 22.05 KG/M2 | WEIGHT: 136.6 LBS | TEMPERATURE: 97.4 F | HEART RATE: 79 BPM | SYSTOLIC BLOOD PRESSURE: 116 MMHG

## 2021-10-27 DIAGNOSIS — R91.8 LUNG NODULES: ICD-10-CM

## 2021-10-27 DIAGNOSIS — Z85.46 HISTORY OF PROSTATE CANCER: ICD-10-CM

## 2021-10-27 DIAGNOSIS — C67.9 MALIGNANT NEOPLASM OF URINARY BLADDER, UNSPECIFIED SITE (HCC): Primary | ICD-10-CM

## 2021-10-27 DIAGNOSIS — R54 ADVANCED AGE: ICD-10-CM

## 2021-10-27 LAB
ABSOLUTE EOS #: 0.4 K/UL (ref 0–0.4)
ABSOLUTE IMMATURE GRANULOCYTE: ABNORMAL K/UL (ref 0–0.3)
ABSOLUTE LYMPH #: 0.9 K/UL (ref 1–4.8)
ABSOLUTE MONO #: 0.9 K/UL (ref 0.1–1.2)
ALBUMIN SERPL-MCNC: 3.1 G/DL (ref 3.5–5.2)
ALBUMIN/GLOBULIN RATIO: 1.1 (ref 1–2.5)
ALP BLD-CCNC: 141 U/L (ref 40–129)
ALT SERPL-CCNC: 15 U/L (ref 5–41)
AMYLASE: 97 U/L (ref 28–100)
ANION GAP SERPL CALCULATED.3IONS-SCNC: 9 MMOL/L (ref 9–17)
AST SERPL-CCNC: 15 U/L
BASOPHILS # BLD: 1 % (ref 0–2)
BASOPHILS ABSOLUTE: 0.1 K/UL (ref 0–0.2)
BILIRUB SERPL-MCNC: 0.22 MG/DL (ref 0.3–1.2)
BUN BLDV-MCNC: 39 MG/DL (ref 8–23)
BUN/CREAT BLD: ABNORMAL (ref 9–20)
CALCIUM SERPL-MCNC: 9.5 MG/DL (ref 8.6–10.4)
CHLORIDE BLD-SCNC: 114 MMOL/L (ref 98–107)
CO2: 17 MMOL/L (ref 20–31)
CORTISOL COLLECTION INFO: ABNORMAL
CORTISOL: 19.6 UG/DL (ref 2.7–18.4)
CREAT SERPL-MCNC: 1.33 MG/DL (ref 0.7–1.2)
DIFFERENTIAL TYPE: ABNORMAL
EOSINOPHILS RELATIVE PERCENT: 6 % (ref 1–4)
GFR AFRICAN AMERICAN: >60 ML/MIN
GFR NON-AFRICAN AMERICAN: 51 ML/MIN
GFR SERPL CREATININE-BSD FRML MDRD: ABNORMAL ML/MIN/{1.73_M2}
GFR SERPL CREATININE-BSD FRML MDRD: ABNORMAL ML/MIN/{1.73_M2}
GLUCOSE BLD-MCNC: 96 MG/DL (ref 70–99)
HCT VFR BLD CALC: 40.9 % (ref 41–53)
HEMOGLOBIN: 13.3 G/DL (ref 13.5–17.5)
IMMATURE GRANULOCYTES: ABNORMAL %
LIPASE: 28 U/L (ref 13–60)
LYMPHOCYTES # BLD: 14 % (ref 24–44)
MCH RBC QN AUTO: 29 PG (ref 26–34)
MCHC RBC AUTO-ENTMCNC: 32.6 G/DL (ref 31–37)
MCV RBC AUTO: 88.7 FL (ref 80–100)
MONOCYTES # BLD: 13 % (ref 2–11)
NRBC AUTOMATED: ABNORMAL PER 100 WBC
PDW BLD-RTO: 15.4 % (ref 12.5–15.4)
PLATELET # BLD: 294 K/UL (ref 140–450)
PLATELET ESTIMATE: ABNORMAL
PMV BLD AUTO: 7 FL (ref 6–12)
POTASSIUM SERPL-SCNC: 4.1 MMOL/L (ref 3.7–5.3)
RBC # BLD: 4.61 M/UL (ref 4.5–5.9)
RBC # BLD: ABNORMAL 10*6/UL
SEG NEUTROPHILS: 66 % (ref 36–66)
SEGMENTED NEUTROPHILS ABSOLUTE COUNT: 4.4 K/UL (ref 1.8–7.7)
SODIUM BLD-SCNC: 140 MMOL/L (ref 135–144)
TOTAL PROTEIN: 5.8 G/DL (ref 6.4–8.3)
TSH SERPL DL<=0.05 MIU/L-ACNC: 3.06 MIU/L (ref 0.3–5)
WBC # BLD: 6.7 K/UL (ref 3.5–11)
WBC # BLD: ABNORMAL 10*3/UL

## 2021-10-27 PROCEDURE — 83690 ASSAY OF LIPASE: CPT

## 2021-10-27 PROCEDURE — 99211 OFF/OP EST MAY X REQ PHY/QHP: CPT | Performed by: INTERNAL MEDICINE

## 2021-10-27 PROCEDURE — 82533 TOTAL CORTISOL: CPT

## 2021-10-27 PROCEDURE — G8427 DOCREV CUR MEDS BY ELIG CLIN: HCPCS | Performed by: INTERNAL MEDICINE

## 2021-10-27 PROCEDURE — 1036F TOBACCO NON-USER: CPT | Performed by: INTERNAL MEDICINE

## 2021-10-27 PROCEDURE — 85025 COMPLETE CBC W/AUTO DIFF WBC: CPT

## 2021-10-27 PROCEDURE — 99214 OFFICE O/P EST MOD 30 MIN: CPT | Performed by: INTERNAL MEDICINE

## 2021-10-27 PROCEDURE — 2580000003 HC RX 258: Performed by: INTERNAL MEDICINE

## 2021-10-27 PROCEDURE — 80053 COMPREHEN METABOLIC PANEL: CPT

## 2021-10-27 PROCEDURE — G8420 CALC BMI NORM PARAMETERS: HCPCS | Performed by: INTERNAL MEDICINE

## 2021-10-27 PROCEDURE — 36591 DRAW BLOOD OFF VENOUS DEVICE: CPT

## 2021-10-27 PROCEDURE — 4040F PNEUMOC VAC/ADMIN/RCVD: CPT | Performed by: INTERNAL MEDICINE

## 2021-10-27 PROCEDURE — 1123F ACP DISCUSS/DSCN MKR DOCD: CPT | Performed by: INTERNAL MEDICINE

## 2021-10-27 PROCEDURE — 82150 ASSAY OF AMYLASE: CPT

## 2021-10-27 PROCEDURE — 96413 CHEMO IV INFUSION 1 HR: CPT

## 2021-10-27 PROCEDURE — 6360000002 HC RX W HCPCS: Performed by: INTERNAL MEDICINE

## 2021-10-27 PROCEDURE — G8484 FLU IMMUNIZE NO ADMIN: HCPCS | Performed by: INTERNAL MEDICINE

## 2021-10-27 PROCEDURE — 84443 ASSAY THYROID STIM HORMONE: CPT

## 2021-10-27 RX ORDER — MEPERIDINE HYDROCHLORIDE 50 MG/ML
12.5 INJECTION INTRAMUSCULAR; INTRAVENOUS; SUBCUTANEOUS ONCE
Status: CANCELLED | OUTPATIENT
Start: 2021-10-27 | End: 2021-10-27

## 2021-10-27 RX ORDER — HEPARIN SODIUM (PORCINE) LOCK FLUSH IV SOLN 100 UNIT/ML 100 UNIT/ML
500 SOLUTION INTRAVENOUS PRN
Status: CANCELLED | OUTPATIENT
Start: 2021-10-27

## 2021-10-27 RX ORDER — SODIUM CHLORIDE 0.9 % (FLUSH) 0.9 %
5-40 SYRINGE (ML) INJECTION PRN
Status: CANCELLED | OUTPATIENT
Start: 2021-10-27

## 2021-10-27 RX ORDER — MEPERIDINE HYDROCHLORIDE 50 MG/ML
12.5 INJECTION INTRAMUSCULAR; INTRAVENOUS; SUBCUTANEOUS ONCE
Status: CANCELLED | OUTPATIENT
Start: 2021-11-17 | End: 2021-11-17

## 2021-10-27 RX ORDER — SODIUM CHLORIDE 9 MG/ML
20 INJECTION, SOLUTION INTRAVENOUS ONCE
Status: CANCELLED | OUTPATIENT
Start: 2021-11-17 | End: 2021-11-17

## 2021-10-27 RX ORDER — SODIUM CHLORIDE 9 MG/ML
20 INJECTION, SOLUTION INTRAVENOUS ONCE
Status: CANCELLED | OUTPATIENT
Start: 2021-10-27 | End: 2021-10-27

## 2021-10-27 RX ORDER — SODIUM CHLORIDE 9 MG/ML
20 INJECTION, SOLUTION INTRAVENOUS ONCE
Status: COMPLETED | OUTPATIENT
Start: 2021-10-27 | End: 2021-10-27

## 2021-10-27 RX ORDER — SODIUM CHLORIDE 9 MG/ML
INJECTION, SOLUTION INTRAVENOUS CONTINUOUS
Status: CANCELLED | OUTPATIENT
Start: 2021-10-27

## 2021-10-27 RX ORDER — DIPHENHYDRAMINE HYDROCHLORIDE 50 MG/ML
50 INJECTION INTRAMUSCULAR; INTRAVENOUS ONCE
Status: CANCELLED | OUTPATIENT
Start: 2021-10-27 | End: 2021-10-27

## 2021-10-27 RX ORDER — SODIUM CHLORIDE 0.9 % (FLUSH) 0.9 %
5-40 SYRINGE (ML) INJECTION PRN
Status: CANCELLED | OUTPATIENT
Start: 2021-11-17

## 2021-10-27 RX ORDER — HEPARIN SODIUM (PORCINE) LOCK FLUSH IV SOLN 100 UNIT/ML 100 UNIT/ML
500 SOLUTION INTRAVENOUS PRN
Status: CANCELLED | OUTPATIENT
Start: 2021-11-17

## 2021-10-27 RX ORDER — HEPARIN SODIUM (PORCINE) LOCK FLUSH IV SOLN 100 UNIT/ML 100 UNIT/ML
500 SOLUTION INTRAVENOUS PRN
Status: DISCONTINUED | OUTPATIENT
Start: 2021-10-27 | End: 2021-10-28 | Stop reason: HOSPADM

## 2021-10-27 RX ORDER — SODIUM CHLORIDE 9 MG/ML
25 INJECTION, SOLUTION INTRAVENOUS PRN
Status: CANCELLED | OUTPATIENT
Start: 2021-10-27

## 2021-10-27 RX ORDER — DIPHENHYDRAMINE HYDROCHLORIDE 50 MG/ML
50 INJECTION INTRAMUSCULAR; INTRAVENOUS ONCE
Status: CANCELLED | OUTPATIENT
Start: 2021-11-17 | End: 2021-11-17

## 2021-10-27 RX ORDER — EPINEPHRINE 1 MG/ML
0.3 INJECTION, SOLUTION, CONCENTRATE INTRAVENOUS PRN
Status: CANCELLED | OUTPATIENT
Start: 2021-10-27

## 2021-10-27 RX ORDER — METHYLPREDNISOLONE SODIUM SUCCINATE 125 MG/2ML
125 INJECTION, POWDER, LYOPHILIZED, FOR SOLUTION INTRAMUSCULAR; INTRAVENOUS ONCE
Status: CANCELLED | OUTPATIENT
Start: 2021-11-17 | End: 2021-11-17

## 2021-10-27 RX ORDER — METHYLPREDNISOLONE SODIUM SUCCINATE 125 MG/2ML
125 INJECTION, POWDER, LYOPHILIZED, FOR SOLUTION INTRAMUSCULAR; INTRAVENOUS ONCE
Status: CANCELLED | OUTPATIENT
Start: 2021-10-27 | End: 2021-10-27

## 2021-10-27 RX ORDER — SODIUM CHLORIDE 0.9 % (FLUSH) 0.9 %
5-40 SYRINGE (ML) INJECTION PRN
Status: DISCONTINUED | OUTPATIENT
Start: 2021-10-27 | End: 2021-10-28 | Stop reason: HOSPADM

## 2021-10-27 RX ORDER — EPINEPHRINE 1 MG/ML
0.3 INJECTION, SOLUTION, CONCENTRATE INTRAVENOUS PRN
Status: CANCELLED | OUTPATIENT
Start: 2021-11-17

## 2021-10-27 RX ORDER — SODIUM CHLORIDE 9 MG/ML
INJECTION, SOLUTION INTRAVENOUS CONTINUOUS
Status: CANCELLED | OUTPATIENT
Start: 2021-11-17

## 2021-10-27 RX ORDER — SODIUM CHLORIDE 9 MG/ML
25 INJECTION, SOLUTION INTRAVENOUS PRN
Status: CANCELLED | OUTPATIENT
Start: 2021-11-17

## 2021-10-27 RX ADMIN — SODIUM CHLORIDE 20 ML/HR: 9 INJECTION, SOLUTION INTRAVENOUS at 09:28

## 2021-10-27 RX ADMIN — HEPARIN 500 UNITS: 100 SYRINGE at 10:00

## 2021-10-27 RX ADMIN — SODIUM CHLORIDE 200 MG: 9 INJECTION, SOLUTION INTRAVENOUS at 09:29

## 2021-10-27 RX ADMIN — SODIUM CHLORIDE, PRESERVATIVE FREE 10 ML: 5 INJECTION INTRAVENOUS at 10:00

## 2021-10-27 NOTE — TELEPHONE ENCOUNTER
AVS from 10/27/21     tx today   [facility]   With specimen from [facility] and liquid biopsy   rv in 3 weeks     Tx today as scheduled    [facility] paperwork completed and given to md to complete, no additional labs needed per infusion rn    RV scheduled 11/17/21 @ 9:45am    PT was given AVS and an appt schedule    Electronically signed by Benny Sabillon on 10/27/2021 at 1:39 PM

## 2021-10-27 NOTE — PROGRESS NOTES
Pt here for C1D1 Keytrnathan. Pt seen by Dr Ana Corrales prior to treatment, refer to his note. Labs drawn from port and results reviewed. Pt was treated without incident and d/c'd in stable condition. Pt will return on 11-17-21 for C2D1 and MD f/u.

## 2021-10-27 NOTE — PATIENT INSTRUCTIONS
tx today   [facility]   With specimen from [facility] state and liquid biopsy   rv in 3 weeks

## 2021-10-27 NOTE — PROGRESS NOTES
Jo Ann Garcia                                                                                                                  10/27/2021  MRN:   [id number]  YOB: 1940  PCP:                           GABE Richards CNP  Referring Physician: No ref. provider found  Treating Physician Name: Loy Cast MD      Reason for visit:  Chief Complaint   Patient presents with    Follow-up     review status of disease   Patient presents to the clinic to discuss results of his biopsy. 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 Patient underwent port placement without any complications. Scheduled for cycle 1 of Keytruda. Overall doing well. Denies pain. Has lost couple of pounds since last office visit. Denies hospitalization hemoptysis or ER visit. During this visit patient's allergy, social, medical, surgical history and medications were reviewed and updated. Summary of Case/History:    Jo Ann Garcia a 79 y.o.male is a patient with history of hypertension prostate cancer status post prostatectomy in 2010 and salvage radiation therapy several years after was recently diagnosed with muscle invasive bladder cancer. Patient initially presented to outside hospital with complaints of flank pain and constipation. Imaging studies showed left hydronephrosis.   Patient underwent cystoscopy on 10/2020 which showed a bladder tumor replacing left ureteral orifice and small tumors on the right and left lateral bladder walls. Patient underwent TURBT on 10/2/2020. Pathology revealed high-grade urothelial cancer muscle invasive. Patient underwent CT PET for staging which was negative for any distant metastasis. Patient underwent radical robotic cystectomy with PLND and left nephroureterectomy in January 2021. Patient post surgical recovery was slow and patient was in and out of the hospital and rehab due to failure to thrive. More recently patient has moved to [facility] to be closer to family, his niece. Patient is currently residing in assisted living and is now doing better. Patient does admit that he has been having memory loss and it was getting difficult for him to take care of himself at home. Patient recently had CT scans done which showed lung nodules concerning for malignancy. Patient is now establishing care in [facility] and was seen by urology who referred patient to medical oncology. Patient at the time of evaluation denies any bloody urine. He has a urostomy bag. Denies shortness of breath. Denies any pain.     Past Medical History:   Past Medical History:   Diagnosis Date    Bladder cancer ([address]     Chronic kidney disease     Hypertension        Past Surgical History:     Past Surgical History:   Procedure Laterality Date    BLADDER REMOVAL      CT NEEDLE BIOPSY LUNG PERCUTANEOUS  10/6/2021    CT NEEDLE BIOPSY LUNG PERCUTANEOUS 10/6/2021 [facility] CT SCAN    IR PORT PLACEMENT EQUAL OR GREATER THAN 5 YEARS  10/25/2021    IR PORT PLACEMENT EQUAL OR GREATER THAN 5 YEARS 10/25/2021 [facility] SPECIAL PROCEDURES    KIDNEY REMOVAL Left     KNEE ARTHROPLASTY      LUNG BIOPSY  10/06/2021    PROSTATE SURGERY      ROTATOR CUFF REPAIR Right        Patient Family Social History:     Social History     Socioeconomic History    Marital status: Single     Spouse name: None    Number of children: None    Years of education: None    Highest education level: None   Occupational History    None   Tobacco Use    Smoking status: Never Smoker    Smokeless tobacco: Never Used   Substance and Sexual Activity    Alcohol use: Not Currently    Drug use: Never    Sexual activity: None   Other Topics Concern    None   Social History Narrative    None     Social Determinants of Health     Financial Resource Strain:     Difficulty of Paying Living Expenses:    Food Insecurity:     Worried About Running Out of Food in the Last Year:     Ran Out of Food in the Last Year:    Transportation Needs:     Lack of Transportation (Medical):  Lack of Transportation (Non-Medical):    Physical Activity:     Days of Exercise per Week:     Minutes of Exercise per Session:    Stress:     Feeling of Stress :    Social Connections:     Frequency of Communication with Friends and Family:     Frequency of Social Gatherings with Friends and Family:     Attends Restoration Services:     Active Member of Clubs or Organizations:     Attends Club or Organization Meetings:     Marital Status:    Intimate Partner Violence:     Fear of Current or Ex-Partner:     Emotionally Abused:     Physically Abused:     Sexually Abused:      Family History   Problem Relation Age of Onset    Heart Disease Mother     Stroke Father     Breast Cancer Father     Other Sister         ALS    Heart Disease Brother      Current Medications:     Current Outpatient Medications   Medication Sig Dispense Refill    acetaminophen (TYLENOL) 325 MG tablet Take 650 mg by mouth every 6 hours as needed for Pain       No current facility-administered medications for this visit.      Facility-Administered Medications Ordered in Other Visits   Medication Dose Route Frequency Provider Last Rate Last Admin    0.9 % sodium chloride infusion  25 mL IntraVENous PRN Travis Shore MD   Stopped at 10/25/21 1315    sodium chloride flush 0.9 % injection 5-40 mL  5-40 mL IntraVENous PRN Travis Shore MD        acetaminophen (TYLENOL) tablet 650 mg  650 mg Oral Q4H PRN Heidy Quintana MD           Allergies:   Codeine    Review of Systems:    Constitutional: No fever or chills. No night sweats, weight relatively stable blood fluctuates  Eyes: No eye discharge, double vision, or eye pain   HEENT: negative for sore mouth, sore throat, hoarseness and voice change   Respiratory: negative for cough , sputum, dyspnea, wheezing, hemoptysis, chest pain   Cardiovascular: negative for chest pain, dyspnea, palpitations, orthopnea, PND   Gastrointestinal: negative for nausea, vomiting, diarrhea, constipation, abdominal pain, Dysphagia, hematemesis and hematochezia   Genitourinary: negative for frequency, dysuria, nocturia, urinary incontinence, and hematuria   Integument: negative for rash, skin lesions, bruises. Hematologic/Lymphatic: negative for easy bruising, bleeding, lymphadenopathy, or petechiae   Endocrine: negative for heat or cold intolerance,weight changes, change in bowel habits and hair loss   Musculoskeletal: negative for myalgias, arthralgias, pain, joint swelling,and bone pain   Neurological: negative for headaches, dizziness, seizures, weakness, numbness. Positive memory loss        Physical Exam:    Vitals: /69   Pulse 79   Temp 97.4 °F (36.3 °C) (Temporal)   Wt 136 lb 9.6 oz (62 kg)   BMI 22.05 kg/m²   General appearance - well appearing, no in pain or distress  Mental status - AAO X3  Eyes - pupils equal and reactive, extraocular eye movements intact  Mouth - mucous membranes moist, pharynx normal without lesions  Neck - supple, no significant adenopathy  Lymphatics - no palpable lymphadenopathy, no hepatosplenomegaly  Chest - clear to auscultation, no wheezes, rales or rhonchi, symmetric air entry  Heart - normal rate, regular rhythm, normal S1, S2, no murmurs  Abdomen - soft, nontender, nondistended, no masses or organomegaly.   urostomy bag in place  Neurological - alert, oriented, normal speech, no focal findings or movement disorder noted  Extremities - peripheral pulses normal, no pedal edema, no clubbing or cyanosis  Skin - normal coloration and turgor, no rashes, no suspicious skin lesions noted       DATA:    SYNOPTIC REPORT FOR BLADDER CARCINOMA:  Cystectomy (p=partial, r=radical, rc=radical cystoprostatectomy): R  Neoadjuvant treatment (y=yes, n=no): N  Invasive/papillary tumor location (r=right lateral, l=left lateral,   a=anterior, p=posterior, d=dome, t=trigone, u=ureter): U, L, T, P  Invasive/papillary tumor size (cm) (na=not applicable): 2.7 CM  Histologic type: UROTHELIAL  Grade (Urothelial: l=low, h=high; Adeno/Squamous cell: 1-3): H  Lymphovascular invasion (n=no, y=yes): Y     Tumor extent (n=no, y=yes, na=not applicable): Invasion of lamina propria: Y  Invasion of inner half of muscularis propria: Y  Invasion of outer half of muscularis propria: Y  Microscopic invasion of perivesicular adipose: Y  Macroscopic invasion of perivesicular adipose: Y  Prostate stroma, seminal vesicle, uterus, vagina invasion: N  In situ tumor in urethra or prostatic ducts: N  Invasion of pelvic wall or abdominal wall: NA     Margins (n=negative, p=positive, na=not applicable): N  Location of positive margin: NA     Regional lymph nodes (n=no, y=yes, na=not applicable):  Number examined: 28  Number positive: 0  Extranodal extension: NA    Additional findings (ex: invasion of ovary/fallopian tube/bowel): PERINEURAL INVASION  pTNM: pT3b N0  TURBT (10/2/20) by Dr. Nel Muñiz     Imaging Studies  CT A/P 7/15/21:  Severe right hydroureter ureter nephrosis despite ureteral stent. * Nonobstructing right renal calculi. * No definitive evidence of malignancy.     CT chest 7/15/21:  Multiple bilateral pulmonary nodules largest measures 1 cm in the right upper lobe and right lower lobe.  These could be benign or malignant.  Comparison to old examination from outside institution suggested.  If these are not available, a PET scan could be obtained. Impression:  History of high-grade urothelial cancer, muscle invasive, pathological stage pT3b pN0 M0-10/2010. Status post radical robotic cystectomy with PLND and left nephroureterectomy with IC-1/4/2021  Pulmonary metastasis, biopsy-proven-10/2021  Keytruda-cycle 1 day 110/27/21  History of prostate cancer-2010. Status post radical prostatectomy-2010 and salvage EBRT-2015  CKD     Plan:  I had a detailed discussion with the patient and personally went over results of lab work-up imaging studies and other relevant clinical data. Labs are adequate for treatment  Reviewed goals and expectations. We will start patient on Keytruda  Unfortunately biopsy specimen was inadequate for NGS testing or PD-L1 analysis. We will obtain liquid biopsy also order NGS testing on cystectomy specimen at [facility] goals and expectations. Given CKD patient not a candidate for cisplatin. Reviewed potential side effects of Keytruda reviewed logistics. Continue surveillance for prostate cancer  Patient has right-sided hydronephrosis. Patient follows up with urology. NCCN guidelines were reviewed and discussed with the patient. The diagnosis and care plan were discussed with the patient in detail. I discussed the natural history of the disease, prognosis, risks and goals of therapy and answered all the patients questions to the best of my ability. Patient expressed understanding and was in agreement. George Voss MD      This note is created with the assistance of a speech recognition program.  While intending to generate a document that actually reflects the content of the visit, the document can still have some errors including those of syntax and sound a like substitutions which may escape proof reading. It such instances, actual meaning can be extrapolated by contextual diversion.

## 2021-10-27 NOTE — TELEPHONE ENCOUNTER
Name: Clarissa Briscoe  : 1940  MRN: [id number]    Oncology Navigation Follow-Up Note  Navigator met with pt. And niece face to face . Writer reviewing resources available and niece and pt. Verbalizing understanding . Writer reviewing [employer] dashboard and insufficient tissue to run and [employer] did not flex to Liquid. MD made aware this AM.  Navigation packet given to pt. Along with business card and \"who to call when form\"  Plan to follow.    Electronically signed by Andres Clayton RN on 10/27/2021 at 8:32 AM

## 2021-10-28 ENCOUNTER — TELEPHONE (OUTPATIENT)
Dept: INFUSION THERAPY | Age: 81
End: 2021-10-28

## 2021-10-28 NOTE — TELEPHONE ENCOUNTER
Needed to refax order with more surgical path results. Confirmation received that they have our paperwork.

## 2021-11-01 ENCOUNTER — TELEPHONE (OUTPATIENT)
Dept: ONCOLOGY | Age: 81
End: 2021-11-01

## 2021-11-02 ENCOUNTER — TELEPHONE (OUTPATIENT)
Dept: ONCOLOGY | Age: 81
End: 2021-11-02

## 2021-11-02 NOTE — TELEPHONE ENCOUNTER
[id number] Nutrition Note    PGSGA scoring tool reviewed with score <4. Automatic nutrition assessment consult populates from PGSGA tool for scores > 4. Will continue to follow as requested.     KEE Cha, RD, LD  Registered Dietitian  [facility]  [phone]

## 2021-11-17 ENCOUNTER — OFFICE VISIT (OUTPATIENT)
Dept: ONCOLOGY | Age: 81
End: 2021-11-17
Payer: MEDICARE

## 2021-11-17 ENCOUNTER — TELEPHONE (OUTPATIENT)
Dept: ONCOLOGY | Age: 81
End: 2021-11-17

## 2021-11-17 ENCOUNTER — HOSPITAL ENCOUNTER (OUTPATIENT)
Dept: INFUSION THERAPY | Age: 81
Discharge: HOME | End: 2021-11-17
Payer: MEDICARE

## 2021-11-17 VITALS
WEIGHT: 143 LBS | BODY MASS INDEX: 23.08 KG/M2 | HEART RATE: 88 BPM | SYSTOLIC BLOOD PRESSURE: 132 MMHG | TEMPERATURE: 97.7 F | DIASTOLIC BLOOD PRESSURE: 83 MMHG

## 2021-11-17 DIAGNOSIS — C67.9 MALIGNANT NEOPLASM OF URINARY BLADDER, UNSPECIFIED SITE (HCC): Primary | ICD-10-CM

## 2021-11-17 DIAGNOSIS — Z85.46 HISTORY OF PROSTATE CANCER: ICD-10-CM

## 2021-11-17 DIAGNOSIS — R54 ADVANCED AGE: ICD-10-CM

## 2021-11-17 DIAGNOSIS — R91.8 LUNG NODULES: ICD-10-CM

## 2021-11-17 DIAGNOSIS — Z85.51 HISTORY OF BLADDER CANCER: ICD-10-CM

## 2021-11-17 DIAGNOSIS — E83.52 HYPERCALCEMIA: ICD-10-CM

## 2021-11-17 LAB
ABSOLUTE EOS #: 0.47 K/UL (ref 0–0.4)
ABSOLUTE IMMATURE GRANULOCYTE: ABNORMAL K/UL (ref 0–0.3)
ABSOLUTE LYMPH #: 0.78 K/UL (ref 1–4.8)
ABSOLUTE MONO #: 1.01 K/UL (ref 0.1–1.2)
ALBUMIN SERPL-MCNC: 3.8 G/DL (ref 3.5–5.2)
ALBUMIN/GLOBULIN RATIO: 1.3 (ref 1–2.5)
ALP BLD-CCNC: 167 U/L (ref 40–129)
ALT SERPL-CCNC: 23 U/L (ref 5–41)
AMYLASE: 128 U/L (ref 28–100)
ANION GAP SERPL CALCULATED.3IONS-SCNC: 12 MMOL/L (ref 9–17)
AST SERPL-CCNC: 22 U/L
BASOPHILS # BLD: 1 % (ref 0–2)
BASOPHILS ABSOLUTE: 0.08 K/UL (ref 0–0.2)
BILIRUB SERPL-MCNC: 0.22 MG/DL (ref 0.3–1.2)
BUN BLDV-MCNC: 47 MG/DL (ref 8–23)
BUN/CREAT BLD: ABNORMAL (ref 9–20)
CALCIUM SERPL-MCNC: 10.6 MG/DL (ref 8.6–10.4)
CHLORIDE BLD-SCNC: 107 MMOL/L (ref 98–107)
CO2: 19 MMOL/L (ref 20–31)
CORTISOL COLLECTION INFO: NORMAL
CORTISOL: 15.6 UG/DL (ref 2.7–18.4)
CREAT SERPL-MCNC: 1.5 MG/DL (ref 0.7–1.2)
DIFFERENTIAL TYPE: ABNORMAL
EOSINOPHILS RELATIVE PERCENT: 6 % (ref 1–4)
GFR AFRICAN AMERICAN: 54 ML/MIN
GFR NON-AFRICAN AMERICAN: 45 ML/MIN
GFR SERPL CREATININE-BSD FRML MDRD: ABNORMAL ML/MIN/{1.73_M2}
GFR SERPL CREATININE-BSD FRML MDRD: ABNORMAL ML/MIN/{1.73_M2}
GLUCOSE BLD-MCNC: 101 MG/DL (ref 70–99)
HCT VFR BLD CALC: 44.7 % (ref 41–53)
HEMOGLOBIN: 14.5 G/DL (ref 13.5–17.5)
IMMATURE GRANULOCYTES: ABNORMAL %
LIPASE: 45 U/L (ref 13–60)
LYMPHOCYTES # BLD: 10 % (ref 24–44)
MCH RBC QN AUTO: 28.8 PG (ref 26–34)
MCHC RBC AUTO-ENTMCNC: 32.4 G/DL (ref 31–37)
MCV RBC AUTO: 88.9 FL (ref 80–100)
MONOCYTES # BLD: 13 % (ref 2–11)
MORPHOLOGY: NORMAL
NRBC AUTOMATED: ABNORMAL PER 100 WBC
PDW BLD-RTO: 15.2 % (ref 12.5–15.4)
PLATELET # BLD: 237 K/UL (ref 140–450)
PLATELET ESTIMATE: ABNORMAL
PMV BLD AUTO: 7.2 FL (ref 6–12)
POTASSIUM SERPL-SCNC: 5.1 MMOL/L (ref 3.7–5.3)
RBC # BLD: 5.03 M/UL (ref 4.5–5.9)
RBC # BLD: ABNORMAL 10*6/UL
SEG NEUTROPHILS: 70 % (ref 36–66)
SEGMENTED NEUTROPHILS ABSOLUTE COUNT: 5.46 K/UL (ref 1.8–7.7)
SODIUM BLD-SCNC: 138 MMOL/L (ref 135–144)
TOTAL PROTEIN: 6.7 G/DL (ref 6.4–8.3)
TSH SERPL DL<=0.05 MIU/L-ACNC: 2.39 MIU/L (ref 0.3–5)
WBC # BLD: 7.8 K/UL (ref 3.5–11)
WBC # BLD: ABNORMAL 10*3/UL

## 2021-11-17 PROCEDURE — 83690 ASSAY OF LIPASE: CPT

## 2021-11-17 PROCEDURE — 36591 DRAW BLOOD OFF VENOUS DEVICE: CPT

## 2021-11-17 PROCEDURE — 96413 CHEMO IV INFUSION 1 HR: CPT

## 2021-11-17 PROCEDURE — 99211 OFF/OP EST MAY X REQ PHY/QHP: CPT | Performed by: INTERNAL MEDICINE

## 2021-11-17 PROCEDURE — 82533 TOTAL CORTISOL: CPT

## 2021-11-17 PROCEDURE — 80053 COMPREHEN METABOLIC PANEL: CPT

## 2021-11-17 PROCEDURE — 1036F TOBACCO NON-USER: CPT | Performed by: INTERNAL MEDICINE

## 2021-11-17 PROCEDURE — 84443 ASSAY THYROID STIM HORMONE: CPT

## 2021-11-17 PROCEDURE — 4040F PNEUMOC VAC/ADMIN/RCVD: CPT | Performed by: INTERNAL MEDICINE

## 2021-11-17 PROCEDURE — 6360000002 HC RX W HCPCS: Performed by: INTERNAL MEDICINE

## 2021-11-17 PROCEDURE — 2580000003 HC RX 258: Performed by: INTERNAL MEDICINE

## 2021-11-17 PROCEDURE — 99214 OFFICE O/P EST MOD 30 MIN: CPT | Performed by: INTERNAL MEDICINE

## 2021-11-17 PROCEDURE — G8427 DOCREV CUR MEDS BY ELIG CLIN: HCPCS | Performed by: INTERNAL MEDICINE

## 2021-11-17 PROCEDURE — 85025 COMPLETE CBC W/AUTO DIFF WBC: CPT

## 2021-11-17 PROCEDURE — 82150 ASSAY OF AMYLASE: CPT

## 2021-11-17 PROCEDURE — G8484 FLU IMMUNIZE NO ADMIN: HCPCS | Performed by: INTERNAL MEDICINE

## 2021-11-17 PROCEDURE — G8420 CALC BMI NORM PARAMETERS: HCPCS | Performed by: INTERNAL MEDICINE

## 2021-11-17 PROCEDURE — 1123F ACP DISCUSS/DSCN MKR DOCD: CPT | Performed by: INTERNAL MEDICINE

## 2021-11-17 RX ORDER — DIPHENHYDRAMINE HYDROCHLORIDE 50 MG/ML
50 INJECTION INTRAMUSCULAR; INTRAVENOUS ONCE
Status: CANCELLED | OUTPATIENT
Start: 2021-12-08 | End: 2021-12-08

## 2021-11-17 RX ORDER — MEPERIDINE HYDROCHLORIDE 50 MG/ML
12.5 INJECTION INTRAMUSCULAR; INTRAVENOUS; SUBCUTANEOUS ONCE
Status: CANCELLED | OUTPATIENT
Start: 2021-12-08 | End: 2021-12-08

## 2021-11-17 RX ORDER — SODIUM CHLORIDE 9 MG/ML
20 INJECTION, SOLUTION INTRAVENOUS ONCE
Status: DISCONTINUED | OUTPATIENT
Start: 2021-11-17 | End: 2021-11-18 | Stop reason: HOSPADM

## 2021-11-17 RX ORDER — SODIUM CHLORIDE 9 MG/ML
INJECTION, SOLUTION INTRAVENOUS CONTINUOUS
Status: CANCELLED | OUTPATIENT
Start: 2021-12-08

## 2021-11-17 RX ORDER — SODIUM CHLORIDE 9 MG/ML
20 INJECTION, SOLUTION INTRAVENOUS ONCE
Status: CANCELLED | OUTPATIENT
Start: 2021-12-08 | End: 2021-12-08

## 2021-11-17 RX ORDER — EPINEPHRINE 1 MG/ML
0.3 INJECTION, SOLUTION, CONCENTRATE INTRAVENOUS PRN
Status: CANCELLED | OUTPATIENT
Start: 2021-12-08

## 2021-11-17 RX ORDER — SODIUM CHLORIDE 0.9 % (FLUSH) 0.9 %
5-40 SYRINGE (ML) INJECTION PRN
Status: CANCELLED | OUTPATIENT
Start: 2021-12-08

## 2021-11-17 RX ORDER — METHYLPREDNISOLONE SODIUM SUCCINATE 125 MG/2ML
125 INJECTION, POWDER, LYOPHILIZED, FOR SOLUTION INTRAMUSCULAR; INTRAVENOUS ONCE
Status: CANCELLED | OUTPATIENT
Start: 2021-12-08 | End: 2021-12-08

## 2021-11-17 RX ORDER — SODIUM CHLORIDE 0.9 % (FLUSH) 0.9 %
5-40 SYRINGE (ML) INJECTION PRN
Status: DISCONTINUED | OUTPATIENT
Start: 2021-11-17 | End: 2021-11-18 | Stop reason: HOSPADM

## 2021-11-17 RX ORDER — HEPARIN SODIUM (PORCINE) LOCK FLUSH IV SOLN 100 UNIT/ML 100 UNIT/ML
500 SOLUTION INTRAVENOUS PRN
Status: DISCONTINUED | OUTPATIENT
Start: 2021-11-17 | End: 2021-11-18 | Stop reason: HOSPADM

## 2021-11-17 RX ORDER — HEPARIN SODIUM (PORCINE) LOCK FLUSH IV SOLN 100 UNIT/ML 100 UNIT/ML
500 SOLUTION INTRAVENOUS PRN
Status: CANCELLED | OUTPATIENT
Start: 2021-12-08

## 2021-11-17 RX ORDER — SODIUM CHLORIDE 9 MG/ML
25 INJECTION, SOLUTION INTRAVENOUS PRN
Status: CANCELLED | OUTPATIENT
Start: 2021-12-08

## 2021-11-17 RX ADMIN — SODIUM CHLORIDE, PRESERVATIVE FREE 10 ML: 5 INJECTION INTRAVENOUS at 12:19

## 2021-11-17 RX ADMIN — HEPARIN 500 UNITS: 100 SYRINGE at 12:19

## 2021-11-17 RX ADMIN — SODIUM CHLORIDE 20 ML/HR: 9 INJECTION, SOLUTION INTRAVENOUS at 11:15

## 2021-11-17 RX ADMIN — SODIUM CHLORIDE 200 MG: 9 INJECTION, SOLUTION INTRAVENOUS at 11:48

## 2021-11-17 NOTE — PROGRESS NOTES
Gisele Godwin                                                                                                                  11/17/2021  MRN:   [id number]  YOB: 1940  PCP:                           GABE White CNP  Referring Physician: No ref. provider found  Treating Physician Name: Leroy Addison MD      Reason for visit:  Chief Complaint   Patient presents with    Follow-up     review status of disease   Patient presents to the clinic to discuss results of his biopsy. Current problems:  History of high-grade urothelial cancer, muscle invasive, pathological stage pT3b pN0 M0-10/2020  Lung metastasis from urothelial cancer, biopsy-proven-10/2021  History of prostate cancer-2010  CKD    Active and recent treatments:  Status post radical prostatectomy-2010 and salvage EBRT-2015  Status post radical robotic cystectomy with PLND and left nephroureterectomy with IC-1/4/2021  Keytruda-cycle 1 day 110/27/21    Interim History:    Patient presents to the clinic for follow-up visit accompanied by his niece. Patient tolerated cycle 1 without any unexpected or severe side effects. Continues to do well. Denies nausea vomiting fever chills. Denies any headache. Denies any hemoptysis or shortness of breath. He is scheduled for cycle #2 today. During this visit patient's allergy, social, medical, surgical history and medications were reviewed and updated. Summary of Case/History:    Gisele Godwin a 79 y.o.male is a patient with history of hypertension prostate cancer status post prostatectomy in 2010 and salvage radiation therapy several years after was recently diagnosed with muscle invasive bladder cancer. Patient initially presented to outside hospital with complaints of flank pain and constipation. Imaging studies showed left hydronephrosis.   Patient underwent cystoscopy on 10/2020 which showed a bladder tumor replacing left ureteral orifice and small tumors on the right and left lateral bladder walls. Patient underwent TURBT on 10/2/2020. Pathology revealed high-grade urothelial cancer muscle invasive. Patient underwent CT PET for staging which was negative for any distant metastasis. Patient underwent radical robotic cystectomy with PLND and left nephroureterectomy in January 2021. Patient post surgical recovery was slow and patient was in and out of the hospital and rehab due to failure to thrive. More recently patient has moved to [place] to be closer to family, his niece. Patient is currently residing in assisted living and is now doing better. Patient does admit that he has been having memory loss and it was getting difficult for him to take care of himself at home. Patient recently had CT scans done which showed lung nodules concerning for malignancy. Patient is now establishing care in [place] and was seen by urology who referred patient to medical oncology. Patient at the time of evaluation denies any bloody urine. He has a urostomy bag. Denies shortness of breath. Denies any pain.     Past Medical History:   Past Medical History:   Diagnosis Date    Bladder cancer ([facility] [address]     Chronic kidney disease     Hypertension        Past Surgical History:     Past Surgical History:   Procedure Laterality Date    BLADDER REMOVAL      CT NEEDLE BIOPSY LUNG PERCUTANEOUS  10/6/2021    CT NEEDLE BIOPSY LUNG PERCUTANEOUS 10/6/2021 [facility] CT SCAN    IR PORT PLACEMENT EQUAL OR GREATER THAN 5 YEARS  10/25/2021    IR PORT PLACEMENT EQUAL OR GREATER THAN 5 YEARS 10/25/2021 [facility] PROCEDURES    KIDNEY REMOVAL Left     KNEE ARTHROPLASTY      LUNG BIOPSY  10/06/2021    PROSTATE SURGERY      ROTATOR CUFF REPAIR Right        Patient Family Social History:     Social History     Socioeconomic History    Marital status: Single     Spouse name: None    Number of children: None    Years of education: None    Highest education level: None   Occupational History    None   Tobacco Use    Smoking status: Never Smoker    Smokeless tobacco: Never Used   Substance and Sexual Activity    Alcohol use: Not Currently    Drug use: Never    Sexual activity: None   Other Topics Concern    None   Social History Narrative    None     Social Determinants of Health     Financial Resource Strain:     Difficulty of Paying Living Expenses: Not on file   Food Insecurity:     Worried About Running Out of Food in the Last Year: Not on file    Lance of Food in the Last Year: Not on file   Transportation Needs:     Lack of Transportation (Medical): Not on file    Lack of Transportation (Non-Medical):  Not on file   Physical Activity:     Days of Exercise per Week: Not on file    Minutes of Exercise per Session: Not on file   Stress:     Feeling of Stress : Not on file   Social Connections:     Frequency of Communication with Friends and Family: Not on file    Frequency of Social Gatherings with Friends and Family: Not on file    Attends Oriental orthodox Services: Not on file    Active Member of [address] [place] [address] or Organizations: Not on file    Attends Club or Organization Meetings: Not on file    Marital Status: Not on file   Intimate Partner Violence:     Fear of Current or Ex-Partner: Not on file    Emotionally Abused: Not on file    Physically Abused: Not on file    Sexually Abused: Not on file   Housing Stability:     Unable to Pay for Housing in the Last Year: Not on file    Number of [place] in the Last Year: Not on file    Unstable Housing in the Last Year: Not on file     Family History   Problem Relation Age of Onset    Heart Disease Mother     Stroke Father     Breast Cancer Father     Other Sister         ALS    Heart Disease Brother      Current Medications:     Current Outpatient Medications   Medication Sig Dispense Refill    acetaminophen (TYLENOL) 325 MG tablet Take 650 mg by mouth every 6 hours as needed for Pain       No current facility-administered medications for this visit. Allergies:   Codeine    Review of Systems:    Constitutional: No fever or chills. No night sweats, weight relatively stable blood fluctuates  Eyes: No eye discharge, double vision, or eye pain   HEENT: negative for sore mouth, sore throat, hoarseness and voice change   Respiratory: negative for cough , sputum, dyspnea, wheezing, hemoptysis, chest pain   Cardiovascular: negative for chest pain, dyspnea, palpitations, orthopnea, PND   Gastrointestinal: negative for nausea, vomiting, diarrhea, constipation, abdominal pain, Dysphagia, hematemesis and hematochezia   Genitourinary: negative for frequency, dysuria, nocturia, urinary incontinence, and hematuria   Integument: negative for rash, skin lesions, bruises. Hematologic/Lymphatic: negative for easy bruising, bleeding, lymphadenopathy, or petechiae   Endocrine: negative for heat or cold intolerance,weight changes, change in bowel habits and hair loss   Musculoskeletal: negative for myalgias, arthralgias, pain, joint swelling,and bone pain   Neurological: negative for headaches, dizziness, seizures, weakness, numbness. Positive memory loss        Physical Exam:    Vitals: /83   Pulse 88   Temp 97.7 °F (36.5 °C) (Temporal)   Wt 143 lb (64.9 kg)   BMI 23.08 kg/m²   General appearance - well appearing, no in pain or distress  Mental status - AAO X3  Eyes - pupils equal and reactive, extraocular eye movements intact  Mouth - mucous membranes moist, pharynx normal without lesions  Neck - supple, no significant adenopathy  Lymphatics - no palpable lymphadenopathy, no hepatosplenomegaly  Chest - clear to auscultation, no wheezes, rales or rhonchi, symmetric air entry  Heart - normal rate, regular rhythm, normal S1, S2, no murmurs  Abdomen - soft, nontender, nondistended, no masses or organomegaly.   urostomy bag in place  Neurological - alert, oriented, normal speech, no focal findings or movement disorder noted  Extremities - peripheral pulses normal, no pedal edema, no clubbing or cyanosis  Skin - normal coloration and turgor, no rashes, no suspicious skin lesions noted       DATA:    SYNOPTIC REPORT FOR BLADDER CARCINOMA:  Cystectomy (p=partial, r=radical, rc=radical cystoprostatectomy): R  Neoadjuvant treatment (y=yes, n=no): N  Invasive/papillary tumor location (r=right lateral, l=left lateral,   a=anterior, p=posterior, d=dome, t=trigone, u=ureter): U, L, T, P  Invasive/papillary tumor size (cm) (na=not applicable): 2.7 CM  Histologic type: UROTHELIAL  Grade (Urothelial: l=low, h=high; Adeno/Squamous cell: 1-3): H  Lymphovascular invasion (n=no, y=yes): Y     Tumor extent (n=no, y=yes, na=not applicable): Invasion of lamina propria: Y  Invasion of inner half of muscularis propria: Y  Invasion of outer half of muscularis propria: Y  Microscopic invasion of perivesicular adipose: Y  Macroscopic invasion of perivesicular adipose: Y  Prostate stroma, seminal vesicle, uterus, vagina invasion: N  In situ tumor in urethra or prostatic ducts: N  Invasion of pelvic wall or abdominal wall: NA     Margins (n=negative, p=positive, na=not applicable): N  Location of positive margin: NA     Regional lymph nodes (n=no, y=yes, na=not applicable):  Number examined: 28  Number positive: 0  Extranodal extension: NA    Additional findings (ex: invasion of ovary/fallopian tube/bowel): PERINEURAL INVASION  pTNM: pT3b N0  TURBT (10/2/20) by Dr. Deepa Muñiz     Imaging Studies  CT A/P 7/15/21:  Severe right hydroureter ureter nephrosis despite ureteral stent. * Nonobstructing right renal calculi. * No definitive evidence of malignancy. CT chest 7/15/21:  Multiple bilateral pulmonary nodules largest measures 1 cm in the right upper lobe and right lower lobe.  These could be benign or malignant.  Comparison to old examination from outside institution suggested.  If these are not available, a PET scan could be obtained.         Impression:  History of high-grade urothelial cancer, muscle invasive, pathological stage pT3b pN0 M0-10/2010. Status post radical robotic cystectomy with PLND and left nephroureterectomy with IC-1/4/2021  Pulmonary metastasis, biopsy-proven-10/2021  Keytruda-cycle 1 day 110/27/21  History of prostate cancer-2010. Status post radical prostatectomy-2010 and salvage EBRT-2015  CKD     Plan:  Patient seen and examined. Personally reviewed results of lab work-up and the relevant clinical data. Toxicity check performed. Overall patient is tolerating treatment without unexpected or severe side effects. Reviewed results of NGS testing. Tumor specimen shows high tumor mutation burden suggestive of good response to immunotherapy. Continue Keytruda. Patient is not a candidate for cisplatin based chemotherapy. Continue surveillance for prostate cancer  Patient will continue to follow-up with urology for right hydronephrosis  Labs are adequate for cycle #2. Proceed with treatment. We will obtain scans after cycle 4. NCCN guidelines were reviewed and discussed with the patient. The diagnosis and care plan were discussed with the patient in detail. I discussed the natural history of the disease, prognosis, risks and goals of therapy and answered all the patients questions to the best of my ability. Patient expressed understanding and was in agreement. Ulisses Rice MD      This note is created with the assistance of a speech recognition program.  While intending to generate a document that actually reflects the content of the visit, the document can still have some errors including those of syntax and sound a like substitutions which may escape proof reading. It such instances, actual meaning can be extrapolated by contextual diversion.

## 2021-11-17 NOTE — TELEPHONE ENCOUNTER
AVS from 11/17/21    tx today  rv in 3 weeks    Tx today as scheduled    RV scheduled 12/8/21    PT was given AVS and an appt schedule    Electronically signed by Flaquita Whitfield on 11/17/2021 at 12:46 PM

## 2021-11-17 NOTE — PROGRESS NOTES
Pt is here for c2d1 keytruda. He saw Dr. Vu Harvey prior to infusion. See dictation for further notes. Tolerated without incident. Discharged stable and ambulatory with family member.

## 2021-12-08 ENCOUNTER — OFFICE VISIT (OUTPATIENT)
Dept: ONCOLOGY | Age: 81
End: 2021-12-08
Payer: MEDICARE

## 2021-12-08 ENCOUNTER — TELEPHONE (OUTPATIENT)
Dept: ONCOLOGY | Age: 81
End: 2021-12-08

## 2021-12-08 ENCOUNTER — HOSPITAL ENCOUNTER (OUTPATIENT)
Dept: INFUSION THERAPY | Age: 81
Discharge: HOME | End: 2021-12-08
Payer: MEDICARE

## 2021-12-08 VITALS
TEMPERATURE: 97.8 F | SYSTOLIC BLOOD PRESSURE: 138 MMHG | DIASTOLIC BLOOD PRESSURE: 86 MMHG | WEIGHT: 145.6 LBS | HEART RATE: 91 BPM | BODY MASS INDEX: 23.5 KG/M2

## 2021-12-08 DIAGNOSIS — R91.8 LUNG NODULES: ICD-10-CM

## 2021-12-08 DIAGNOSIS — Z85.46 HISTORY OF PROSTATE CANCER: ICD-10-CM

## 2021-12-08 DIAGNOSIS — R54 ADVANCED AGE: ICD-10-CM

## 2021-12-08 DIAGNOSIS — C67.9 MALIGNANT NEOPLASM OF URINARY BLADDER, UNSPECIFIED SITE (HCC): Primary | ICD-10-CM

## 2021-12-08 LAB
ABSOLUTE EOS #: 0.6 K/UL (ref 0–0.4)
ABSOLUTE IMMATURE GRANULOCYTE: ABNORMAL K/UL (ref 0–0.3)
ABSOLUTE LYMPH #: 1 K/UL (ref 1–4.8)
ABSOLUTE MONO #: 0.9 K/UL (ref 0.1–1.2)
ALBUMIN SERPL-MCNC: 3.5 G/DL (ref 3.5–5.2)
ALBUMIN/GLOBULIN RATIO: 1.2 (ref 1–2.5)
ALP BLD-CCNC: 149 U/L (ref 40–129)
ALT SERPL-CCNC: 25 U/L (ref 5–41)
AMYLASE: 134 U/L (ref 28–100)
ANION GAP SERPL CALCULATED.3IONS-SCNC: 9 MMOL/L (ref 9–17)
AST SERPL-CCNC: 23 U/L
BASOPHILS # BLD: 1 % (ref 0–2)
BASOPHILS ABSOLUTE: 0.1 K/UL (ref 0–0.2)
BILIRUB SERPL-MCNC: 0.3 MG/DL (ref 0.3–1.2)
BUN BLDV-MCNC: 43 MG/DL (ref 8–23)
BUN/CREAT BLD: ABNORMAL (ref 9–20)
CALCIUM SERPL-MCNC: 10.2 MG/DL (ref 8.6–10.4)
CHLORIDE BLD-SCNC: 108 MMOL/L (ref 98–107)
CO2: 18 MMOL/L (ref 20–31)
CORTISOL COLLECTION INFO: NORMAL
CORTISOL: 9.7 UG/DL (ref 2.7–18.4)
CREAT SERPL-MCNC: 1.52 MG/DL (ref 0.7–1.2)
DIFFERENTIAL TYPE: ABNORMAL
EOSINOPHILS RELATIVE PERCENT: 6 % (ref 1–4)
GFR AFRICAN AMERICAN: 53 ML/MIN
GFR NON-AFRICAN AMERICAN: 44 ML/MIN
GFR SERPL CREATININE-BSD FRML MDRD: ABNORMAL ML/MIN/{1.73_M2}
GFR SERPL CREATININE-BSD FRML MDRD: ABNORMAL ML/MIN/{1.73_M2}
GLUCOSE BLD-MCNC: 108 MG/DL (ref 70–99)
HCT VFR BLD CALC: 42.5 % (ref 41–53)
HEMOGLOBIN: 14.2 G/DL (ref 13.5–17.5)
IMMATURE GRANULOCYTES: ABNORMAL %
LIPASE: 34 U/L (ref 13–60)
LYMPHOCYTES # BLD: 10 % (ref 24–44)
MCH RBC QN AUTO: 29.2 PG (ref 26–34)
MCHC RBC AUTO-ENTMCNC: 33.3 G/DL (ref 31–37)
MCV RBC AUTO: 87.7 FL (ref 80–100)
MONOCYTES # BLD: 9 % (ref 2–11)
NRBC AUTOMATED: ABNORMAL PER 100 WBC
PDW BLD-RTO: 15.5 % (ref 12.5–15.4)
PLATELET # BLD: 244 K/UL (ref 140–450)
PLATELET ESTIMATE: ABNORMAL
PMV BLD AUTO: 7.3 FL (ref 6–12)
POTASSIUM SERPL-SCNC: 4.5 MMOL/L (ref 3.7–5.3)
RBC # BLD: 4.85 M/UL (ref 4.5–5.9)
RBC # BLD: ABNORMAL 10*6/UL
SEG NEUTROPHILS: 74 % (ref 36–66)
SEGMENTED NEUTROPHILS ABSOLUTE COUNT: 7.2 K/UL (ref 1.8–7.7)
SODIUM BLD-SCNC: 135 MMOL/L (ref 135–144)
TOTAL PROTEIN: 6.5 G/DL (ref 6.4–8.3)
TSH SERPL DL<=0.05 MIU/L-ACNC: 1.78 MIU/L (ref 0.3–5)
WBC # BLD: 9.7 K/UL (ref 3.5–11)
WBC # BLD: ABNORMAL 10*3/UL

## 2021-12-08 PROCEDURE — 80053 COMPREHEN METABOLIC PANEL: CPT

## 2021-12-08 PROCEDURE — 99215 OFFICE O/P EST HI 40 MIN: CPT | Performed by: INTERNAL MEDICINE

## 2021-12-08 PROCEDURE — 82150 ASSAY OF AMYLASE: CPT

## 2021-12-08 PROCEDURE — 36591 DRAW BLOOD OFF VENOUS DEVICE: CPT

## 2021-12-08 PROCEDURE — 2580000003 HC RX 258: Performed by: INTERNAL MEDICINE

## 2021-12-08 PROCEDURE — 1036F TOBACCO NON-USER: CPT | Performed by: INTERNAL MEDICINE

## 2021-12-08 PROCEDURE — G8427 DOCREV CUR MEDS BY ELIG CLIN: HCPCS | Performed by: INTERNAL MEDICINE

## 2021-12-08 PROCEDURE — 83690 ASSAY OF LIPASE: CPT

## 2021-12-08 PROCEDURE — 99211 OFF/OP EST MAY X REQ PHY/QHP: CPT | Performed by: INTERNAL MEDICINE

## 2021-12-08 PROCEDURE — 82533 TOTAL CORTISOL: CPT

## 2021-12-08 PROCEDURE — 6360000002 HC RX W HCPCS: Performed by: INTERNAL MEDICINE

## 2021-12-08 PROCEDURE — 85025 COMPLETE CBC W/AUTO DIFF WBC: CPT

## 2021-12-08 PROCEDURE — 1123F ACP DISCUSS/DSCN MKR DOCD: CPT | Performed by: INTERNAL MEDICINE

## 2021-12-08 PROCEDURE — 84443 ASSAY THYROID STIM HORMONE: CPT

## 2021-12-08 PROCEDURE — G8420 CALC BMI NORM PARAMETERS: HCPCS | Performed by: INTERNAL MEDICINE

## 2021-12-08 PROCEDURE — 4040F PNEUMOC VAC/ADMIN/RCVD: CPT | Performed by: INTERNAL MEDICINE

## 2021-12-08 PROCEDURE — G8484 FLU IMMUNIZE NO ADMIN: HCPCS | Performed by: INTERNAL MEDICINE

## 2021-12-08 PROCEDURE — 96413 CHEMO IV INFUSION 1 HR: CPT

## 2021-12-08 RX ORDER — MEPERIDINE HYDROCHLORIDE 50 MG/ML
12.5 INJECTION INTRAMUSCULAR; INTRAVENOUS; SUBCUTANEOUS ONCE
Status: CANCELLED | OUTPATIENT
Start: 2022-01-19 | End: 2021-12-29

## 2021-12-08 RX ORDER — DIPHENHYDRAMINE HYDROCHLORIDE 50 MG/ML
50 INJECTION INTRAMUSCULAR; INTRAVENOUS ONCE
Status: CANCELLED | OUTPATIENT
Start: 2022-01-19 | End: 2021-12-29

## 2021-12-08 RX ORDER — EPINEPHRINE 1 MG/ML
0.3 INJECTION, SOLUTION, CONCENTRATE INTRAVENOUS PRN
Status: CANCELLED | OUTPATIENT
Start: 2022-01-19

## 2021-12-08 RX ORDER — SODIUM CHLORIDE 0.9 % (FLUSH) 0.9 %
5-40 SYRINGE (ML) INJECTION PRN
Status: DISCONTINUED | OUTPATIENT
Start: 2021-12-08 | End: 2021-12-09 | Stop reason: HOSPADM

## 2021-12-08 RX ORDER — SODIUM CHLORIDE 9 MG/ML
25 INJECTION, SOLUTION INTRAVENOUS PRN
Status: CANCELLED | OUTPATIENT
Start: 2022-01-19

## 2021-12-08 RX ORDER — SODIUM CHLORIDE 9 MG/ML
20 INJECTION, SOLUTION INTRAVENOUS ONCE
Status: COMPLETED | OUTPATIENT
Start: 2021-12-08 | End: 2021-12-08

## 2021-12-08 RX ORDER — SODIUM CHLORIDE 0.9 % (FLUSH) 0.9 %
5-40 SYRINGE (ML) INJECTION PRN
Status: CANCELLED | OUTPATIENT
Start: 2022-01-19

## 2021-12-08 RX ORDER — SODIUM CHLORIDE 9 MG/ML
20 INJECTION, SOLUTION INTRAVENOUS ONCE
Status: CANCELLED | OUTPATIENT
Start: 2022-01-19 | End: 2021-12-29

## 2021-12-08 RX ORDER — HEPARIN SODIUM (PORCINE) LOCK FLUSH IV SOLN 100 UNIT/ML 100 UNIT/ML
500 SOLUTION INTRAVENOUS PRN
Status: CANCELLED | OUTPATIENT
Start: 2022-01-19

## 2021-12-08 RX ORDER — SODIUM CHLORIDE 9 MG/ML
INJECTION, SOLUTION INTRAVENOUS CONTINUOUS
Status: CANCELLED | OUTPATIENT
Start: 2022-01-19

## 2021-12-08 RX ORDER — HEPARIN SODIUM (PORCINE) LOCK FLUSH IV SOLN 100 UNIT/ML 100 UNIT/ML
500 SOLUTION INTRAVENOUS PRN
Status: DISCONTINUED | OUTPATIENT
Start: 2021-12-08 | End: 2021-12-09 | Stop reason: HOSPADM

## 2021-12-08 RX ORDER — METHYLPREDNISOLONE SODIUM SUCCINATE 125 MG/2ML
125 INJECTION, POWDER, LYOPHILIZED, FOR SOLUTION INTRAMUSCULAR; INTRAVENOUS ONCE
Status: CANCELLED | OUTPATIENT
Start: 2022-01-19 | End: 2021-12-29

## 2021-12-08 RX ORDER — LIDOCAINE AND PRILOCAINE 25; 25 MG/G; MG/G
CREAM TOPICAL
Qty: 30 G | Refills: 1 | Status: SHIPPED | OUTPATIENT
Start: 2021-12-08 | End: 2022-04-08

## 2021-12-08 RX ADMIN — SODIUM CHLORIDE 20 ML/HR: 9 INJECTION, SOLUTION INTRAVENOUS at 13:51

## 2021-12-08 RX ADMIN — SODIUM CHLORIDE, PRESERVATIVE FREE 10 ML: 5 INJECTION INTRAVENOUS at 13:51

## 2021-12-08 RX ADMIN — SODIUM CHLORIDE, PRESERVATIVE FREE 10 ML: 5 INJECTION INTRAVENOUS at 14:52

## 2021-12-08 RX ADMIN — SODIUM CHLORIDE 200 MG: 9 INJECTION, SOLUTION INTRAVENOUS at 14:18

## 2021-12-08 RX ADMIN — HEPARIN 500 UNITS: 100 SYRINGE at 14:52

## 2021-12-08 NOTE — PROGRESS NOTES
Ellie Moreno                                                                                                                  12/8/2021  MRN:   [id number]  YOB: 1940  PCP:                           GABE Hickey - CNP  Referring Physician: No ref. provider found  Treating Physician Name: Wilton Odell MD      Reason for visit:  Chief Complaint   Patient presents with    Follow-up     review status of disease   Toxicity check. Discussed treatment plan. Current problems:  History of high-grade urothelial cancer, muscle invasive, pathological stage pT3b pN0 M0-10/2020  Lung metastasis from urothelial cancer, biopsy-proven-10/2021  History of prostate cancer-2010  CKD    Active and recent treatments:  Status post radical prostatectomy-2010 and salvage EBRT-2015  Status post radical robotic cystectomy with PLND and left nephroureterectomy with IC-1/4/2021  Keytruda-cycle 1 day 110/27/21    Interim History:    Patient presents to the clinic for a follow-up visit and to discuss results of his lab work-up and other relevant clinical data. Labs are pending at this point. Patient is complaining of itching which started couple weeks ago. Denies any associated rash. Patient states that he has tried moisturizing lotion at home but it is not helping. He denies hospitalization or ER visit. Patient has gained weight. Appetite has improved. Denies fever chills. Is scheduled for cycle 3 today. During this visit patient's allergy, social, medical, surgical history and medications were reviewed and updated. Summary of Case/History:    Ellie Moreno a 79 y.o.male is a patient with history of hypertension prostate cancer status post prostatectomy in 2010 and salvage radiation therapy several years after was recently diagnosed with muscle invasive bladder cancer. Patient initially presented to outside hospital with complaints of flank pain and constipation.   Imaging studies showed left hydronephrosis. Patient underwent cystoscopy on 10/2020 which showed a bladder tumor replacing left ureteral orifice and small tumors on the right and left lateral bladder walls. Patient underwent TURBT on 10/2/2020. Pathology revealed high-grade urothelial cancer muscle invasive. Patient underwent CT PET for staging which was negative for any distant metastasis. Patient underwent radical robotic cystectomy with PLND and left nephroureterectomy in January 2021. Patient post surgical recovery was slow and patient was in and out of the hospital and rehab due to failure to thrive. More recently patient has moved to [place] to be closer to family, his niece. Patient is currently residing in assisted living and is now doing better. Patient does admit that he has been having memory loss and it was getting difficult for him to take care of himself at home. Patient recently had CT scans done which showed lung nodules concerning for malignancy. Patient is now establishing care in [place] and was seen by urology who referred patient to medical oncology. Patient at the time of evaluation denies any bloody urine. He has a urostomy bag. Denies shortness of breath. Denies any pain.     Past Medical History:   Past Medical History:   Diagnosis Date    Bladder cancer ([address]     Chronic kidney disease     Hypertension        Past Surgical History:     Past Surgical History:   Procedure Laterality Date    BLADDER REMOVAL      CT NEEDLE BIOPSY LUNG PERCUTANEOUS  10/6/2021    CT NEEDLE BIOPSY LUNG PERCUTANEOUS 10/6/2021 [facility] CT SCAN    IR PORT PLACEMENT EQUAL OR GREATER THAN 5 YEARS  10/25/2021    IR PORT PLACEMENT EQUAL OR GREATER THAN 5 YEARS 10/25/2021 [facility]    KIDNEY REMOVAL Left     KNEE ARTHROPLASTY      LUNG BIOPSY  10/06/2021    PROSTATE SURGERY      ROTATOR CUFF REPAIR Right        Patient Family Social History:     Social History     Socioeconomic History    Marital status: Single Spouse name: None    Number of children: None    Years of education: None    Highest education level: None   Occupational History    None   Tobacco Use    Smoking status: Never Smoker    Smokeless tobacco: Never Used   Substance and Sexual Activity    Alcohol use: Not Currently    Drug use: Never    Sexual activity: None   Other Topics Concern    None   Social History Narrative    None     Social Determinants of Health     Financial Resource Strain:     Difficulty of Paying Living Expenses: Not on file   Food Insecurity:     Worried About Running Out of Food in the Last Year: Not on file    Lance of Food in the Last Year: Not on file   Transportation Needs:     Lack of Transportation (Medical): Not on file    Lack of Transportation (Non-Medical):  Not on file   Physical Activity:     Days of Exercise per Week: Not on file    Minutes of Exercise per Session: Not on file   Stress:     Feeling of Stress : Not on file   Social Connections:     Frequency of Communication with Friends and Family: Not on file    Frequency of Social Gatherings with Friends and Family: Not on file    Attends Rastafari Services: Not on file    Active Member of [address] [place] [address] or Organizations: Not on file    Attends Club or Organization Meetings: Not on file    Marital Status: Not on file   Intimate Partner Violence:     Fear of Current or Ex-Partner: Not on file    Emotionally Abused: Not on file    Physically Abused: Not on file    Sexually Abused: Not on file   Housing Stability:     Unable to Pay for Housing in the Last Year: Not on file    Number of [place] in the Last Year: Not on file    Unstable Housing in the Last Year: Not on file     Family History   Problem Relation Age of Onset    Heart Disease Mother     Stroke Father     Breast Cancer Father     Other Sister         ALS    Heart Disease Brother      Current Medications:     Current Outpatient Medications   Medication Sig Dispense Refill    acetaminophen (TYLENOL) 325 MG tablet Take 650 mg by mouth every 6 hours as needed for Pain      lidocaine-prilocaine (EMLA) 2.5-2.5 % cream Apply topically daily as needed prior to blood draws and treatments. 30 g 1     No current facility-administered medications for this visit. Allergies:   Codeine    Review of Systems:    Constitutional: No fever or chills. No night sweats, positive weight gain  Eyes: No eye discharge, double vision, or eye pain   HEENT: negative for sore mouth, sore throat, hoarseness and voice change   Respiratory: negative for cough , sputum, dyspnea, wheezing, hemoptysis, chest pain   Cardiovascular: negative for chest pain, dyspnea, palpitations, orthopnea, PND   Gastrointestinal: negative for nausea, vomiting, diarrhea, constipation, abdominal pain, Dysphagia, hematemesis and hematochezia   Genitourinary: negative for frequency, dysuria, nocturia, urinary incontinence, and hematuria   Integument: negative for rash, skin lesions, bruises. Positive for itching  Hematologic/Lymphatic: negative for easy bruising, bleeding, lymphadenopathy, or petechiae   Endocrine: negative for heat or cold intolerance,weight changes, change in bowel habits and hair loss   Musculoskeletal: negative for myalgias, arthralgias, pain, joint swelling,and bone pain   Neurological: negative for headaches, dizziness, seizures, weakness, numbness.   Positive memory loss        Physical Exam:    Vitals: /86   Pulse 91   Temp 97.8 °F (36.6 °C) (Temporal)   Wt 145 lb 9.6 oz (66 kg)   BMI 23.50 kg/m²   General appearance - well appearing, no in pain or distress  Mental status - AAO X3  Eyes - pupils equal and reactive, extraocular eye movements intact  Mouth - mucous membranes moist, pharynx normal without lesions  Neck - supple, no significant adenopathy  Lymphatics - no palpable lymphadenopathy, no hepatosplenomegaly  Chest - clear to auscultation, no wheezes, rales or rhonchi, symmetric air entry  Heart - normal rate, regular rhythm, normal S1, S2, no murmurs  Abdomen - soft, nontender, nondistended, no masses or organomegaly. urostomy bag in place  Neurological - alert, oriented, normal speech, no focal findings or movement disorder noted  Extremities - peripheral pulses normal, no pedal edema, no clubbing or cyanosis  Skin - normal coloration and turgor, no rashes, no suspicious skin lesions noted       DATA:    SYNOPTIC REPORT FOR BLADDER CARCINOMA:  Cystectomy (p=partial, r=radical, rc=radical cystoprostatectomy): R  Neoadjuvant treatment (y=yes, n=no): N  Invasive/papillary tumor location (r=right lateral, l=left lateral,   a=anterior, p=posterior, d=dome, t=trigone, u=ureter): U, L, T, P  Invasive/papillary tumor size (cm) (na=not applicable): 2.7 CM  Histologic type: UROTHELIAL  Grade (Urothelial: l=low, h=high; Adeno/Squamous cell: 1-3): H  Lymphovascular invasion (n=no, y=yes): Y     Tumor extent (n=no, y=yes, na=not applicable): Invasion of lamina propria: Y  Invasion of inner half of muscularis propria: Y  Invasion of outer half of muscularis propria: Y  Microscopic invasion of perivesicular adipose: Y  Macroscopic invasion of perivesicular adipose: Y  Prostate stroma, seminal vesicle, uterus, vagina invasion: N  In situ tumor in urethra or prostatic ducts: N  Invasion of pelvic wall or abdominal wall: NA     Margins (n=negative, p=positive, na=not applicable): N  Location of positive margin: NA     Regional lymph nodes (n=no, y=yes, na=not applicable):  Number examined: 28  Number positive: 0  Extranodal extension: NA    Additional findings (ex: invasion of ovary/fallopian tube/bowel): PERINEURAL INVASION  pTNM: pT3b N0  TURBT (10/2/20) by Dr. Rachele Muñiz     Imaging Studies  CT A/P 7/15/21:  Severe right hydroureter ureter nephrosis despite ureteral stent. * Nonobstructing right renal calculi. * No definitive evidence of malignancy.     CT chest 7/15/21:  Multiple bilateral pulmonary nodules largest measures 1 cm in the right upper lobe and right lower lobe.  These could be benign or malignant.  Comparison to old examination from outside institution suggested.  If these are not available, a PET scan could be obtained. Impression:  History of high-grade urothelial cancer, muscle invasive, pathological stage pT3b pN0 M0-10/2010. Status post radical robotic cystectomy with PLND and left nephroureterectomy with IC-1/4/2021  Pulmonary metastasis, biopsy-proven-10/2021  Keytruda-cycle 1 day 110/27/21  History of prostate cancer-2010. Status post radical prostatectomy-2010 and salvage EBRT-2015  CKD     Plan:  Patient seen and examined. Personally reviewed results of lab work-up and the relevant clinical data. Toxicity check performed. Patient is overall tolerating treatment well without any unexpected or severe side effects. Patient does not have any rash. Itching can still be due to Radhika Bue. I advised the patient to try using Eucerin lotion and antihistamine to see if it helps with the itching. If it persists we will reevaluate and possibly consider steroids if pruritus is thought to be because of Keytruda. Patient was also advised to avoid hot water long baths. Reiterated treatment plan. We will assess response with scans after cycle 4  Reiterated logistics prognostic data and potential side effects  Patient is not a candidate for cisplatin based chemotherapy. Continue surveillance for prostate cancer  Patient will continue to follow-up with urology for right hydronephrosis  We will proceed with cycle 4 after labs are back. NCCN guidelines were reviewed and discussed with the patient. The diagnosis and care plan were discussed with the patient in detail. I discussed the natural history of the disease, prognosis, risks and goals of therapy and answered all the patients questions to the best of my ability. Patient expressed understanding and was in agreement.     Addendum:  Labs are adequate for treatment. We will proceed with treatment      Brandi Rodriguez MD      I spent more than 40 minutes examining, evaluating, reviewing data, counseling the patient and coordinating care. Greater than 50% of time was spent face-to-face with the patient this note is created with the assistance of a speech recognition program.  While intending to generate a document that actually reflects the content of the visit, the document can still have some errors including those of syntax and sound a like substitutions which may escape proof reading. It such instances, actual meaning can be extrapolated by contextual diversion.

## 2021-12-08 NOTE — PROGRESS NOTES
Patient here for [address]. Labs reviewed. Denies any issues at this time. He saw Dr. Maritza Pathak today see his dictation. He tolerated treatment well and was discharged home in stable condition. He is due to return 12/29 for C4D1 Keytruda.

## 2021-12-08 NOTE — TELEPHONE ENCOUNTER
AVS from 12/8/21    tx today after labs   rv in mid jan with pet a week prior     tx today as scheduled  Pet/ct scheduled for 1/17/21 @ 12:30p  rv scheduled for 1/19/22 @ 11:45am    Pt was given AVS and appt schedule

## 2022-01-03 ENCOUNTER — TELEPHONE (OUTPATIENT)
Dept: INFUSION THERAPY | Age: 82
End: 2022-01-03

## 2022-01-03 NOTE — TELEPHONE ENCOUNTER
Anahi, Toni's niece, called and wanted to know if it was ok for them to keep the 1/17 PET scan and 1/19 Dr office and treatment schedule. He was sick for his last treatment and cancelled. Informed Dr Gifty Washington of this and he states we can gor ahead as planned. Called and informed Anahi of Dr Gael Koyanagi answer.

## 2022-01-17 ENCOUNTER — HOSPITAL ENCOUNTER (OUTPATIENT)
Dept: NUCLEAR MEDICINE | Age: 82
Discharge: HOME | End: 2022-01-19
Payer: MEDICARE

## 2022-01-17 DIAGNOSIS — R91.8 LUNG NODULES: ICD-10-CM

## 2022-01-17 DIAGNOSIS — C67.9 MALIGNANT NEOPLASM OF URINARY BLADDER, UNSPECIFIED SITE (HCC): ICD-10-CM

## 2022-01-17 DIAGNOSIS — Z85.46 HISTORY OF PROSTATE CANCER: ICD-10-CM

## 2022-01-17 DIAGNOSIS — R54 ADVANCED AGE: ICD-10-CM

## 2022-01-17 LAB — GLUCOSE BLD-MCNC: 86 MG/DL (ref 75–110)

## 2022-01-17 PROCEDURE — 78815 PET IMAGE W/CT SKULL-THIGH: CPT

## 2022-01-17 PROCEDURE — 2580000003 HC RX 258: Performed by: INTERNAL MEDICINE

## 2022-01-17 PROCEDURE — 3430000000 HC RX DIAGNOSTIC RADIOPHARMACEUTICAL: Performed by: INTERNAL MEDICINE

## 2022-01-17 PROCEDURE — A9552 F18 FDG: HCPCS | Performed by: INTERNAL MEDICINE

## 2022-01-17 PROCEDURE — 82947 ASSAY GLUCOSE BLOOD QUANT: CPT

## 2022-01-17 RX ORDER — SODIUM CHLORIDE 0.9 % (FLUSH) 0.9 %
10 SYRINGE (ML) INJECTION ONCE
Status: COMPLETED | OUTPATIENT
Start: 2022-01-17 | End: 2022-01-17

## 2022-01-17 RX ORDER — FLUDEOXYGLUCOSE F 18 200 MCI/ML
10 INJECTION, SOLUTION INTRAVENOUS
Status: COMPLETED | OUTPATIENT
Start: 2022-01-17 | End: 2022-01-17

## 2022-01-17 RX ADMIN — FLUDEOXYGLUCOSE F 18 10.48 MILLICURIE: 200 INJECTION, SOLUTION INTRAVENOUS at 12:19

## 2022-01-17 RX ADMIN — SODIUM CHLORIDE, PRESERVATIVE FREE 10 ML: 5 INJECTION INTRAVENOUS at 12:19

## 2022-01-19 ENCOUNTER — HOSPITAL ENCOUNTER (OUTPATIENT)
Dept: INFUSION THERAPY | Age: 82
Discharge: HOME | End: 2022-01-19
Payer: MEDICARE

## 2022-01-19 ENCOUNTER — OFFICE VISIT (OUTPATIENT)
Dept: ONCOLOGY | Age: 82
End: 2022-01-19
Payer: MEDICARE

## 2022-01-19 ENCOUNTER — TELEPHONE (OUTPATIENT)
Dept: ONCOLOGY | Age: 82
End: 2022-01-19

## 2022-01-19 VITALS
BODY MASS INDEX: 23.16 KG/M2 | WEIGHT: 143.5 LBS | SYSTOLIC BLOOD PRESSURE: 118 MMHG | TEMPERATURE: 98.4 F | DIASTOLIC BLOOD PRESSURE: 74 MMHG | HEART RATE: 96 BPM

## 2022-01-19 DIAGNOSIS — R91.8 LUNG NODULES: ICD-10-CM

## 2022-01-19 DIAGNOSIS — Z85.46 HISTORY OF PROSTATE CANCER: ICD-10-CM

## 2022-01-19 DIAGNOSIS — N18.9 CHRONIC KIDNEY DISEASE, UNSPECIFIED CKD STAGE: ICD-10-CM

## 2022-01-19 DIAGNOSIS — C67.9 MALIGNANT NEOPLASM OF URINARY BLADDER, UNSPECIFIED SITE (HCC): Primary | ICD-10-CM

## 2022-01-19 DIAGNOSIS — R54 ADVANCED AGE: ICD-10-CM

## 2022-01-19 LAB
ABSOLUTE EOS #: 0.4 K/UL (ref 0–0.4)
ABSOLUTE IMMATURE GRANULOCYTE: ABNORMAL K/UL (ref 0–0.3)
ABSOLUTE LYMPH #: 0.7 K/UL (ref 1–4.8)
ABSOLUTE MONO #: 1.2 K/UL (ref 0.1–1.2)
ALBUMIN SERPL-MCNC: 3.1 G/DL (ref 3.5–5.2)
ALBUMIN/GLOBULIN RATIO: 0.9 (ref 1–2.5)
ALP BLD-CCNC: 150 U/L (ref 40–129)
ALT SERPL-CCNC: 20 U/L (ref 5–41)
AMYLASE: 225 U/L (ref 28–100)
ANION GAP SERPL CALCULATED.3IONS-SCNC: 11 MMOL/L (ref 9–17)
AST SERPL-CCNC: 19 U/L
BASOPHILS # BLD: 1 % (ref 0–2)
BASOPHILS ABSOLUTE: 0.1 K/UL (ref 0–0.2)
BILIRUB SERPL-MCNC: 0.33 MG/DL (ref 0.3–1.2)
BUN BLDV-MCNC: 45 MG/DL (ref 8–23)
BUN/CREAT BLD: ABNORMAL (ref 9–20)
CALCIUM SERPL-MCNC: 10.1 MG/DL (ref 8.6–10.4)
CHLORIDE BLD-SCNC: 109 MMOL/L (ref 98–107)
CO2: 16 MMOL/L (ref 20–31)
CORTISOL COLLECTION INFO: NORMAL
CORTISOL: 14.7 UG/DL (ref 2.7–18.4)
CREAT SERPL-MCNC: 1.82 MG/DL (ref 0.7–1.2)
DIFFERENTIAL TYPE: ABNORMAL
EOSINOPHILS RELATIVE PERCENT: 4 % (ref 1–4)
GFR AFRICAN AMERICAN: 43 ML/MIN
GFR NON-AFRICAN AMERICAN: 36 ML/MIN
GFR SERPL CREATININE-BSD FRML MDRD: ABNORMAL ML/MIN/{1.73_M2}
GFR SERPL CREATININE-BSD FRML MDRD: ABNORMAL ML/MIN/{1.73_M2}
GLUCOSE BLD-MCNC: 94 MG/DL (ref 70–99)
HCT VFR BLD CALC: 44.9 % (ref 41–53)
HEMOGLOBIN: 14.7 G/DL (ref 13.5–17.5)
IMMATURE GRANULOCYTES: ABNORMAL %
LIPASE: 426 U/L (ref 13–60)
LYMPHOCYTES # BLD: 8 % (ref 24–44)
MCH RBC QN AUTO: 29.1 PG (ref 26–34)
MCHC RBC AUTO-ENTMCNC: 32.7 G/DL (ref 31–37)
MCV RBC AUTO: 88.9 FL (ref 80–100)
MONOCYTES # BLD: 14 % (ref 2–11)
NRBC AUTOMATED: ABNORMAL PER 100 WBC
PDW BLD-RTO: 15.7 % (ref 12.5–15.4)
PLATELET # BLD: 239 K/UL (ref 140–450)
PLATELET ESTIMATE: ABNORMAL
PMV BLD AUTO: 6.8 FL (ref 6–12)
POTASSIUM SERPL-SCNC: 4.7 MMOL/L (ref 3.7–5.3)
RBC # BLD: 5.05 M/UL (ref 4.5–5.9)
RBC # BLD: ABNORMAL 10*6/UL
SEG NEUTROPHILS: 73 % (ref 36–66)
SEGMENTED NEUTROPHILS ABSOLUTE COUNT: 6.4 K/UL (ref 1.8–7.7)
SODIUM BLD-SCNC: 136 MMOL/L (ref 135–144)
TOTAL PROTEIN: 6.4 G/DL (ref 6.4–8.3)
TSH SERPL DL<=0.05 MIU/L-ACNC: 2.15 MIU/L (ref 0.3–5)
WBC # BLD: 8.7 K/UL (ref 3.5–11)
WBC # BLD: ABNORMAL 10*3/UL

## 2022-01-19 PROCEDURE — G8427 DOCREV CUR MEDS BY ELIG CLIN: HCPCS | Performed by: INTERNAL MEDICINE

## 2022-01-19 PROCEDURE — 82533 TOTAL CORTISOL: CPT

## 2022-01-19 PROCEDURE — 1123F ACP DISCUSS/DSCN MKR DOCD: CPT | Performed by: INTERNAL MEDICINE

## 2022-01-19 PROCEDURE — 99211 OFF/OP EST MAY X REQ PHY/QHP: CPT | Performed by: INTERNAL MEDICINE

## 2022-01-19 PROCEDURE — 84443 ASSAY THYROID STIM HORMONE: CPT

## 2022-01-19 PROCEDURE — 83690 ASSAY OF LIPASE: CPT

## 2022-01-19 PROCEDURE — 2580000003 HC RX 258: Performed by: INTERNAL MEDICINE

## 2022-01-19 PROCEDURE — 80053 COMPREHEN METABOLIC PANEL: CPT

## 2022-01-19 PROCEDURE — 82150 ASSAY OF AMYLASE: CPT

## 2022-01-19 PROCEDURE — 99215 OFFICE O/P EST HI 40 MIN: CPT | Performed by: INTERNAL MEDICINE

## 2022-01-19 PROCEDURE — 1036F TOBACCO NON-USER: CPT | Performed by: INTERNAL MEDICINE

## 2022-01-19 PROCEDURE — G8420 CALC BMI NORM PARAMETERS: HCPCS | Performed by: INTERNAL MEDICINE

## 2022-01-19 PROCEDURE — 96413 CHEMO IV INFUSION 1 HR: CPT

## 2022-01-19 PROCEDURE — G8484 FLU IMMUNIZE NO ADMIN: HCPCS | Performed by: INTERNAL MEDICINE

## 2022-01-19 PROCEDURE — 4040F PNEUMOC VAC/ADMIN/RCVD: CPT | Performed by: INTERNAL MEDICINE

## 2022-01-19 PROCEDURE — 85025 COMPLETE CBC W/AUTO DIFF WBC: CPT

## 2022-01-19 PROCEDURE — 6360000002 HC RX W HCPCS: Performed by: INTERNAL MEDICINE

## 2022-01-19 RX ORDER — SODIUM CHLORIDE 9 MG/ML
20 INJECTION, SOLUTION INTRAVENOUS ONCE
Status: COMPLETED | OUTPATIENT
Start: 2022-01-19 | End: 2022-01-19

## 2022-01-19 RX ORDER — SODIUM CHLORIDE 0.9 % (FLUSH) 0.9 %
5-40 SYRINGE (ML) INJECTION PRN
Status: DISCONTINUED | OUTPATIENT
Start: 2022-01-19 | End: 2022-01-20 | Stop reason: HOSPADM

## 2022-01-19 RX ORDER — HEPARIN SODIUM (PORCINE) LOCK FLUSH IV SOLN 100 UNIT/ML 100 UNIT/ML
500 SOLUTION INTRAVENOUS PRN
Status: DISCONTINUED | OUTPATIENT
Start: 2022-01-19 | End: 2022-01-20 | Stop reason: HOSPADM

## 2022-01-19 RX ADMIN — HEPARIN 500 UNITS: 100 SYRINGE at 12:13

## 2022-01-19 RX ADMIN — SODIUM CHLORIDE, PRESERVATIVE FREE 10 ML: 5 INJECTION INTRAVENOUS at 12:13

## 2022-01-19 RX ADMIN — SODIUM CHLORIDE, PRESERVATIVE FREE 10 ML: 5 INJECTION INTRAVENOUS at 11:11

## 2022-01-19 RX ADMIN — SODIUM CHLORIDE 20 ML/HR: 9 INJECTION, SOLUTION INTRAVENOUS at 11:11

## 2022-01-19 RX ADMIN — SODIUM CHLORIDE 200 MG: 9 INJECTION, SOLUTION INTRAVENOUS at 11:34

## 2022-01-19 NOTE — TELEPHONE ENCOUNTER
psa with next blood draw lipase and amylase noted  Proceed with tx  rv in 3 weeks    psa added    Tx today as scheduled    RV scheduled 2/9/22 @ 11:45am w/ tx to follow    PT was given AVS and an appt schedule    Electronically signed by Radha Doty on 1/19/2022 at 12:22 PM

## 2022-01-19 NOTE — PROGRESS NOTES
Kathy Vargas                                                                                                                  1/19/2022  MRN:   [id number]  YOB: 1940  PCP:                           GABE Reza - CNP  Referring Physician: No ref. provider found  Treating Physician Name: Noah Rivera MD      Reason for visit:  Chief Complaint   Patient presents with    Follow-up     review status of disease    Results     go over PET scan    Other     questions on treatment     Cough     Dry    Patient presents to the clinic for toxicity check and to discuss results of lab work-up, imaging studies and further treatment plan. 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 He has some cough. His appetite is stable, no abdominal pain. He plans to pursue hearing aids. During this visit patient's allergy, social, medical, surgical history and medications were reviewed and updated. Summary of Case/History:    Kathy Vargas a 79 y.o.male is a patient with history of hypertension prostate cancer status post prostatectomy in 2010 and salvage radiation therapy several years after was recently diagnosed with muscle invasive bladder cancer. Patient initially presented to outside hospital with complaints of flank pain and constipation. Imaging studies showed left hydronephrosis.   Patient underwent cystoscopy on 10/2020 which showed a bladder tumor replacing left ureteral orifice and small tumors on the right and left lateral bladder walls. Patient underwent TURBT on 10/2/2020. Pathology revealed high-grade urothelial cancer muscle invasive. Patient underwent CT PET for staging which was negative for any distant metastasis. Patient underwent radical robotic cystectomy with PLND and left nephroureterectomy in January 2021. Patient post surgical recovery was slow and patient was in and out of the hospital and rehab due to failure to thrive. More recently patient has moved to [facility] to be closer to family, his niece. Patient is currently residing in assisted living and is now doing better. Patient does admit that he has been having memory loss and it was getting difficult for him to take care of himself at home. Patient recently had CT scans done which showed lung nodules concerning for malignancy. Patient is now establishing care in [facility] and was seen by urology who referred patient to medical oncology. Patient at the time of evaluation denies any bloody urine. He has a urostomy bag. Denies shortness of breath. Denies any pain.     Past Medical History:   Past Medical History:   Diagnosis Date    Bladder cancer ([address]     Chronic kidney disease     Hypertension        Past Surgical History:     Past Surgical History:   Procedure Laterality Date    BLADDER REMOVAL      CT NEEDLE BIOPSY LUNG PERCUTANEOUS  10/6/2021    CT NEEDLE BIOPSY LUNG PERCUTANEOUS 10/6/2021 [facility] CT SCAN    IR PORT PLACEMENT EQUAL OR GREATER THAN 5 YEARS  10/25/2021    IR PORT PLACEMENT EQUAL OR GREATER THAN 5 YEARS 10/25/2021 [facility] SPECIAL PROCEDURES    KIDNEY REMOVAL Left     KNEE ARTHROPLASTY      LUNG BIOPSY  10/06/2021    PROSTATE SURGERY      ROTATOR CUFF REPAIR Right        Patient Family Social History:     Social History     Socioeconomic History    Marital status: Single     Spouse name: None    Number of children: None    Years of education: None    Highest education level: None   Occupational History    None   Tobacco Use    Smoking status: Never Smoker    Smokeless tobacco: Never Used   Substance and Sexual Activity    Alcohol use: Not Currently    Drug use: Never    Sexual activity: None   Other Topics Concern    None   Social History Narrative    None     Social Determinants of Health     Financial Resource Strain:     Difficulty of Paying Living Expenses: Not on file   Food Insecurity:     Worried About Running Out of Food in the Last Year: Not on file    Lance of Food in the Last Year: Not on file   Transportation Needs:     Lack of Transportation (Medical): Not on file    Lack of Transportation (Non-Medical):  Not on file   Physical Activity:     Days of Exercise per Week: Not on file    Minutes of Exercise per Session: Not on file   Stress:     Feeling of Stress : Not on file   Social Connections:     Frequency of Communication with Friends and Family: Not on file    Frequency of Social Gatherings with Friends and Family: Not on file    Attends Hindu Services: Not on file    Active Member of [address] [place] [address] or Organizations: Not on file    Attends Club or Organization Meetings: Not on file    Marital Status: Not on file   Intimate Partner Violence:     Fear of Current or Ex-Partner: Not on file    Emotionally Abused: Not on file    Physically Abused: Not on file    Sexually Abused: Not on file   Housing Stability:     Unable to Pay for Housing in the Last Year: Not on file    Number of [place] in the Last Year: Not on file    Unstable Housing in the Last Year: Not on file     Family History   Problem Relation Age of Onset    Heart Disease Mother     Stroke Father     Breast Cancer Father     Other Sister         ALS    Heart Disease Brother      Current Medications:     Current Outpatient Medications   Medication Sig Dispense Refill    acetaminophen (TYLENOL) 325 MG tablet Take 650 mg by mouth every 6 hours as needed for Pain      lidocaine-prilocaine (EMLA) 2.5-2.5 % cream Apply topically daily as needed prior to blood draws and treatments. 30 g 1     No current facility-administered medications for this visit. Allergies:   Codeine    Review of Systems:    Constitutional: No fever or chills. No night sweats, weight stable  Eyes: No eye discharge, double vision, or eye pain   HEENT: negative for sore mouth, sore throat, hoarseness and voice change   Respiratory: negative for cough , sputum, dyspnea, wheezing, hemoptysis, chest pain   Cardiovascular: negative for chest pain, dyspnea, palpitations, orthopnea, PND   Gastrointestinal: negative for nausea, vomiting, diarrhea, constipation, abdominal pain, Dysphagia, hematemesis and hematochezia   Genitourinary: negative for frequency, dysuria, nocturia, urinary incontinence, and hematuria   Integument: negative for rash, skin lesions, bruises. Positive for itching  Hematologic/Lymphatic: negative for easy bruising, bleeding, lymphadenopathy, or petechiae   Endocrine: negative for heat or cold intolerance,weight changes, change in bowel habits and hair loss   Musculoskeletal: negative for myalgias, arthralgias, pain, joint swelling,and bone pain   Neurological: negative for headaches, dizziness, seizures, weakness, numbness.   Positive memory loss        Physical Exam:    Vitals: /74   Pulse 96   Temp 98.4 °F (36.9 °C) (Temporal)   Wt 143 lb 8 oz (65.1 kg)   BMI 23.16 kg/m²   General appearance - well appearing, no in pain or distress  Mental status - AAO X3  Eyes - pupils equal and reactive, extraocular eye movements intact  Mouth - mucous membranes moist, pharynx normal without lesions  Neck - supple, no significant adenopathy  Lymphatics - no palpable lymphadenopathy, no hepatosplenomegaly  Chest - clear to auscultation, no wheezes, rales or rhonchi, symmetric air entry  Heart - normal rate, regular rhythm, normal S1, S2, no murmurs  Abdomen - soft, nontender, nondistended, no masses or organomegaly. urostomy bag in place  Neurological - alert, oriented, normal speech, no focal findings or movement disorder noted  Extremities - peripheral pulses normal, no pedal edema, no clubbing or cyanosis  Skin - normal coloration and turgor, no rashes, no suspicious skin lesions noted       DATA:    Lab Results   Component Value Date    WBC 8.7 01/19/2022    HGB 14.7 01/19/2022    HCT 44.9 01/19/2022    MCV 88.9 01/19/2022     01/19/2022       Chemistry        Component Value Date/Time     01/19/2022 0952    K 4.7 01/19/2022 0952     (H) 01/19/2022 0952    CO2 16 (L) 01/19/2022 0952    BUN 45 (H) 01/19/2022 0952    CREATININE 1.82 (H) 01/19/2022 0952        Component Value Date/Time    CALCIUM 10.1 01/19/2022 0952    ALKPHOS 150 (H) 01/19/2022 0952    AST 19 01/19/2022 0952    ALT 20 01/19/2022 0952    BILITOT 0.33 01/19/2022 0952        PET CT SKULL BASE TO MID THIGH    Result Date: 1/17/2022  EXAMINATION: WHOLE BODY PET/CT 1/17/2022 TECHNIQUE: Following IV injection of 10.5 mCi of F-18 FDG, PET  tumor imaging was acquired from the base of the skull to the mid thighs. Computed tomography was used for purposes of attenuation correction and anatomic localization. Fusion imaging was utilized for interpretation. Uptake time 58 min. Glucose level 86 mg/dl. COMPARISON: PET-CT 09/21/2021, 10/20/2020 HISTORY: ORDERING SYSTEM PROVIDED HISTORY: Malignant neoplasm of urinary bladder, unspecified site [facility]) TECHNOLOGIST PROVIDED HISTORY: assess disease status FINDINGS: HEAD/NECK: Metabolically active left thyroid nodule again demonstrated measuring up to 1 cm with SUV max of 17. CHEST: No abnormal metabolic activity. ABDOMEN/PELVIS: No abnormal metabolic activity. BONES/SOFT TISSUE: No abnormal metabolic activity. Sclerosis is now present in the anterior left 2nd rib without residual metabolic activity. INCIDENTAL CT FINDINGS: Right internal jugular port in place.   Status post left nephrectomy, cystectomy and urinary conduit. 1.  Previously noted metabolically active pulmonary nodules are no longer present. 2.  Metabolic activity previously demonstrated in the anterior left 2nd rib is no longer present. Sclerosis is now present in this area, which may represent a treated metastatic deposit. 3.  No new site of disease identified. 4.  Persistent metabolically active 1 cm left thyroid nodule for which correlation with thyroid ultrasound is recommended. Impression:  History of high-grade urothelial cancer, muscle invasive, pathological stage pT3b pN0 M0-10/2010. Status post radical robotic cystectomy with PLND and left nephroureterectomy with IC-1/4/2021  Pulmonary metastasis, biopsy-proven-10/2021  Keytruda-cycle 1 day 110/27/21  History of prostate cancer-2010. Status post radical prostatectomy-2010 and salvage EBRT-2015  CKD     Plan: We reviewed his PET in detail which shows excellent response to treatment with previous pulmonary nodular uptake no longer present, no new sites of disease, thyroid unchanged. I also reviewed images of patient CT PET and compared with the previous images. His lab work was reviewed, kidney function is slightly worsening, he declined nephrology referral at this time and will follow up with PCP. He has urology follow up scheduled. I completed toxicity check. He is tolerating treatment very well and we will plan to continue to progression. Return in 3 weeks. NCCN guidelines were reviewed and discussed with the patient. The diagnosis and care plan were discussed with the patient in detail. I discussed the natural history of the disease, prognosis, risks and goals of therapy and answered all the patients questions to the best of my ability. Patient expressed understanding and was in agreement.     Scribe Attestation   This note was created by Karla Massey acting as scribe for the physician signing this note  Electronically Signed  Karla Massey 1/19/2022  Scribe, [employer]. Attending Attestation   Note was reviewed and edited. I am in agreement with the note as entered      Cosme Enrique MD        I spent more than 40 minutes examining, evaluating, reviewing data, counseling the patient and coordinating care. Greater than 50% of time was spent face-to-face with the patient this note is created with the assistance of a speech recognition program.  While intending to generate a document that actually reflects the content of the visit, the document can still have some errors including those of syntax and sound a like substitutions which may escape proof reading. It such instances, actual meaning can be extrapolated by contextual diversion.

## 2022-01-19 NOTE — PROGRESS NOTES
Patient here for Jerrell Zimmerman. Labs reviewed. He saw Dr. Keegan Christianson today see his dictation. He tolerated treatment well and was discharged home ins table condition. He is due to return 2/9 for Jerrell Zimmerman.

## 2022-02-09 ENCOUNTER — OFFICE VISIT (OUTPATIENT)
Dept: ONCOLOGY | Age: 82
End: 2022-02-09
Payer: MEDICARE

## 2022-02-09 ENCOUNTER — TELEPHONE (OUTPATIENT)
Dept: ONCOLOGY | Age: 82
End: 2022-02-09

## 2022-02-09 ENCOUNTER — HOSPITAL ENCOUNTER (OUTPATIENT)
Dept: INFUSION THERAPY | Age: 82
Discharge: HOME | End: 2022-02-09
Payer: MEDICARE

## 2022-02-09 VITALS
WEIGHT: 135.6 LBS | DIASTOLIC BLOOD PRESSURE: 84 MMHG | TEMPERATURE: 97.5 F | RESPIRATION RATE: 16 BRPM | BODY MASS INDEX: 21.89 KG/M2 | SYSTOLIC BLOOD PRESSURE: 131 MMHG | HEART RATE: 78 BPM

## 2022-02-09 DIAGNOSIS — E86.0 DEHYDRATION: ICD-10-CM

## 2022-02-09 DIAGNOSIS — N18.9 CHRONIC KIDNEY DISEASE, UNSPECIFIED CKD STAGE: ICD-10-CM

## 2022-02-09 DIAGNOSIS — R91.8 LUNG NODULES: ICD-10-CM

## 2022-02-09 DIAGNOSIS — H91.90 HEARING LOSS, UNSPECIFIED HEARING LOSS TYPE, UNSPECIFIED LATERALITY: ICD-10-CM

## 2022-02-09 DIAGNOSIS — R54 ADVANCED AGE: ICD-10-CM

## 2022-02-09 DIAGNOSIS — Z85.46 HISTORY OF PROSTATE CANCER: ICD-10-CM

## 2022-02-09 DIAGNOSIS — C67.9 MALIGNANT NEOPLASM OF URINARY BLADDER, UNSPECIFIED SITE (HCC): Primary | ICD-10-CM

## 2022-02-09 LAB
ABSOLUTE EOS #: 0.22 K/UL (ref 0–0.4)
ABSOLUTE IMMATURE GRANULOCYTE: ABNORMAL K/UL (ref 0–0.3)
ABSOLUTE LYMPH #: 1 K/UL (ref 1–4.8)
ABSOLUTE MONO #: 1.11 K/UL (ref 0.1–0.8)
ALBUMIN SERPL-MCNC: 3.4 G/DL (ref 3.5–5.2)
ALBUMIN/GLOBULIN RATIO: 0.9 (ref 1–2.5)
ALP BLD-CCNC: 183 U/L (ref 40–129)
ALT SERPL-CCNC: 24 U/L (ref 5–41)
AMYLASE: 291 U/L (ref 28–100)
ANION GAP SERPL CALCULATED.3IONS-SCNC: 9 MMOL/L (ref 9–17)
AST SERPL-CCNC: 19 U/L
BASOPHILS # BLD: 1 % (ref 0–2)
BASOPHILS ABSOLUTE: 0.11 K/UL (ref 0–0.2)
BILIRUB SERPL-MCNC: 0.34 MG/DL (ref 0.3–1.2)
BUN BLDV-MCNC: 51 MG/DL (ref 8–23)
BUN/CREAT BLD: ABNORMAL (ref 9–20)
CALCIUM SERPL-MCNC: 11.4 MG/DL (ref 8.6–10.4)
CHLORIDE BLD-SCNC: 106 MMOL/L (ref 98–107)
CO2: 15 MMOL/L (ref 20–31)
CORTISOL COLLECTION INFO: NORMAL
CORTISOL: 15.2 UG/DL (ref 2.7–18.4)
CREAT SERPL-MCNC: 1.95 MG/DL (ref 0.7–1.2)
DIFFERENTIAL TYPE: ABNORMAL
EOSINOPHILS RELATIVE PERCENT: 2 % (ref 1–4)
GFR AFRICAN AMERICAN: 40 ML/MIN
GFR NON-AFRICAN AMERICAN: 33 ML/MIN
GFR SERPL CREATININE-BSD FRML MDRD: ABNORMAL ML/MIN/{1.73_M2}
GFR SERPL CREATININE-BSD FRML MDRD: ABNORMAL ML/MIN/{1.73_M2}
GLUCOSE BLD-MCNC: 97 MG/DL (ref 70–99)
HCT VFR BLD CALC: 49.6 % (ref 41–53)
HEMOGLOBIN: 16 G/DL (ref 13.5–17.5)
IMMATURE GRANULOCYTES: ABNORMAL %
LIPASE: 547 U/L (ref 13–60)
LYMPHOCYTES # BLD: 9 % (ref 24–44)
MCH RBC QN AUTO: 28.3 PG (ref 26–34)
MCHC RBC AUTO-ENTMCNC: 32.2 G/DL (ref 31–37)
MCV RBC AUTO: 88 FL (ref 80–100)
MONOCYTES # BLD: 10 % (ref 1–7)
MORPHOLOGY: NORMAL
NRBC AUTOMATED: ABNORMAL PER 100 WBC
PDW BLD-RTO: 15.3 % (ref 12.5–15.4)
PLATELET # BLD: 312 K/UL (ref 140–450)
PLATELET ESTIMATE: ABNORMAL
PMV BLD AUTO: 6.9 FL (ref 6–12)
POTASSIUM SERPL-SCNC: 5.5 MMOL/L (ref 3.7–5.3)
RBC # BLD: 5.63 M/UL (ref 4.5–5.9)
RBC # BLD: ABNORMAL 10*6/UL
SEG NEUTROPHILS: 78 % (ref 36–66)
SEGMENTED NEUTROPHILS ABSOLUTE COUNT: 8.66 K/UL (ref 1.8–7.7)
SODIUM BLD-SCNC: 130 MMOL/L (ref 135–144)
TOTAL PROTEIN: 7.1 G/DL (ref 6.4–8.3)
TSH SERPL DL<=0.05 MIU/L-ACNC: 2.41 MIU/L (ref 0.3–5)
WBC # BLD: 11.1 K/UL (ref 3.5–11)
WBC # BLD: ABNORMAL 10*3/UL

## 2022-02-09 PROCEDURE — 4040F PNEUMOC VAC/ADMIN/RCVD: CPT | Performed by: INTERNAL MEDICINE

## 2022-02-09 PROCEDURE — 99211 OFF/OP EST MAY X REQ PHY/QHP: CPT | Performed by: INTERNAL MEDICINE

## 2022-02-09 PROCEDURE — 80053 COMPREHEN METABOLIC PANEL: CPT

## 2022-02-09 PROCEDURE — 36415 COLL VENOUS BLD VENIPUNCTURE: CPT

## 2022-02-09 PROCEDURE — G8427 DOCREV CUR MEDS BY ELIG CLIN: HCPCS | Performed by: INTERNAL MEDICINE

## 2022-02-09 PROCEDURE — 2580000003 HC RX 258: Performed by: INTERNAL MEDICINE

## 2022-02-09 PROCEDURE — 84443 ASSAY THYROID STIM HORMONE: CPT

## 2022-02-09 PROCEDURE — 82150 ASSAY OF AMYLASE: CPT

## 2022-02-09 PROCEDURE — 99215 OFFICE O/P EST HI 40 MIN: CPT | Performed by: INTERNAL MEDICINE

## 2022-02-09 PROCEDURE — 82533 TOTAL CORTISOL: CPT

## 2022-02-09 PROCEDURE — 83690 ASSAY OF LIPASE: CPT

## 2022-02-09 PROCEDURE — 6360000002 HC RX W HCPCS: Performed by: INTERNAL MEDICINE

## 2022-02-09 PROCEDURE — 96361 HYDRATE IV INFUSION ADD-ON: CPT

## 2022-02-09 PROCEDURE — 96360 HYDRATION IV INFUSION INIT: CPT

## 2022-02-09 PROCEDURE — 1123F ACP DISCUSS/DSCN MKR DOCD: CPT | Performed by: INTERNAL MEDICINE

## 2022-02-09 PROCEDURE — 1036F TOBACCO NON-USER: CPT | Performed by: INTERNAL MEDICINE

## 2022-02-09 PROCEDURE — G8420 CALC BMI NORM PARAMETERS: HCPCS | Performed by: INTERNAL MEDICINE

## 2022-02-09 PROCEDURE — 85025 COMPLETE CBC W/AUTO DIFF WBC: CPT

## 2022-02-09 PROCEDURE — G8484 FLU IMMUNIZE NO ADMIN: HCPCS | Performed by: INTERNAL MEDICINE

## 2022-02-09 RX ORDER — MEPERIDINE HYDROCHLORIDE 50 MG/ML
12.5 INJECTION INTRAMUSCULAR; INTRAVENOUS; SUBCUTANEOUS ONCE
Status: CANCELLED | OUTPATIENT
Start: 2022-02-23 | End: 2022-03-02

## 2022-02-09 RX ORDER — EPINEPHRINE 1 MG/ML
0.3 INJECTION, SOLUTION, CONCENTRATE INTRAVENOUS PRN
Status: CANCELLED | OUTPATIENT
Start: 2022-02-09

## 2022-02-09 RX ORDER — DIPHENHYDRAMINE HYDROCHLORIDE 50 MG/ML
50 INJECTION INTRAMUSCULAR; INTRAVENOUS ONCE
Status: CANCELLED | OUTPATIENT
Start: 2022-02-09 | End: 2022-02-09

## 2022-02-09 RX ORDER — HEPARIN SODIUM (PORCINE) LOCK FLUSH IV SOLN 100 UNIT/ML 100 UNIT/ML
500 SOLUTION INTRAVENOUS PRN
Status: DISCONTINUED | OUTPATIENT
Start: 2022-02-09 | End: 2022-02-10 | Stop reason: HOSPADM

## 2022-02-09 RX ORDER — SODIUM CHLORIDE 0.9 % (FLUSH) 0.9 %
5-40 SYRINGE (ML) INJECTION PRN
Status: CANCELLED | OUTPATIENT
Start: 2022-02-23

## 2022-02-09 RX ORDER — HEPARIN SODIUM (PORCINE) LOCK FLUSH IV SOLN 100 UNIT/ML 100 UNIT/ML
500 SOLUTION INTRAVENOUS PRN
Status: CANCELLED | OUTPATIENT
Start: 2022-02-09

## 2022-02-09 RX ORDER — SODIUM CHLORIDE 9 MG/ML
25 INJECTION, SOLUTION INTRAVENOUS PRN
Status: CANCELLED | OUTPATIENT
Start: 2022-02-23

## 2022-02-09 RX ORDER — HEPARIN SODIUM (PORCINE) LOCK FLUSH IV SOLN 100 UNIT/ML 100 UNIT/ML
500 SOLUTION INTRAVENOUS PRN
Status: CANCELLED | OUTPATIENT
Start: 2022-02-23

## 2022-02-09 RX ORDER — EPINEPHRINE 1 MG/ML
0.3 INJECTION, SOLUTION, CONCENTRATE INTRAVENOUS PRN
Status: CANCELLED | OUTPATIENT
Start: 2022-02-23

## 2022-02-09 RX ORDER — 0.9 % SODIUM CHLORIDE 0.9 %
1000 INTRAVENOUS SOLUTION INTRAVENOUS ONCE
Status: CANCELLED | OUTPATIENT
Start: 2022-02-09 | End: 2022-02-09

## 2022-02-09 RX ORDER — ONDANSETRON 4 MG/1
4 TABLET, FILM COATED ORAL EVERY 8 HOURS PRN
COMMUNITY

## 2022-02-09 RX ORDER — MEPERIDINE HYDROCHLORIDE 50 MG/ML
12.5 INJECTION INTRAMUSCULAR; INTRAVENOUS; SUBCUTANEOUS ONCE
Status: CANCELLED | OUTPATIENT
Start: 2022-02-09 | End: 2022-02-09

## 2022-02-09 RX ORDER — SODIUM CHLORIDE 0.9 % (FLUSH) 0.9 %
5-40 SYRINGE (ML) INJECTION PRN
Status: CANCELLED | OUTPATIENT
Start: 2022-02-09

## 2022-02-09 RX ORDER — METHYLPREDNISOLONE SODIUM SUCCINATE 125 MG/2ML
125 INJECTION, POWDER, LYOPHILIZED, FOR SOLUTION INTRAMUSCULAR; INTRAVENOUS ONCE
Status: CANCELLED | OUTPATIENT
Start: 2022-02-09 | End: 2022-02-09

## 2022-02-09 RX ORDER — SODIUM CHLORIDE 9 MG/ML
INJECTION, SOLUTION INTRAVENOUS CONTINUOUS
Status: CANCELLED | OUTPATIENT
Start: 2022-02-09

## 2022-02-09 RX ORDER — SODIUM CHLORIDE 9 MG/ML
20 INJECTION, SOLUTION INTRAVENOUS ONCE
Status: CANCELLED | OUTPATIENT
Start: 2022-02-09 | End: 2022-02-09

## 2022-02-09 RX ORDER — METHYLPREDNISOLONE SODIUM SUCCINATE 125 MG/2ML
125 INJECTION, POWDER, LYOPHILIZED, FOR SOLUTION INTRAMUSCULAR; INTRAVENOUS ONCE
Status: CANCELLED | OUTPATIENT
Start: 2022-02-23 | End: 2022-03-02

## 2022-02-09 RX ORDER — DIPHENHYDRAMINE HYDROCHLORIDE 50 MG/ML
50 INJECTION INTRAMUSCULAR; INTRAVENOUS ONCE
Status: CANCELLED | OUTPATIENT
Start: 2022-02-23 | End: 2022-03-02

## 2022-02-09 RX ORDER — SODIUM CHLORIDE 0.9 % (FLUSH) 0.9 %
5-40 SYRINGE (ML) INJECTION PRN
Status: DISCONTINUED | OUTPATIENT
Start: 2022-02-09 | End: 2022-02-10 | Stop reason: HOSPADM

## 2022-02-09 RX ORDER — SODIUM CHLORIDE 9 MG/ML
25 INJECTION, SOLUTION INTRAVENOUS PRN
Status: CANCELLED | OUTPATIENT
Start: 2022-02-09

## 2022-02-09 RX ORDER — SODIUM CHLORIDE 9 MG/ML
INJECTION, SOLUTION INTRAVENOUS CONTINUOUS
Status: CANCELLED | OUTPATIENT
Start: 2022-02-23

## 2022-02-09 RX ORDER — 0.9 % SODIUM CHLORIDE 0.9 %
1000 INTRAVENOUS SOLUTION INTRAVENOUS ONCE
Status: COMPLETED | OUTPATIENT
Start: 2022-02-09 | End: 2022-02-09

## 2022-02-09 RX ORDER — SODIUM CHLORIDE 9 MG/ML
20 INJECTION, SOLUTION INTRAVENOUS ONCE
Status: CANCELLED | OUTPATIENT
Start: 2022-02-23 | End: 2022-03-02

## 2022-02-09 RX ADMIN — SODIUM CHLORIDE, PRESERVATIVE FREE 10 ML: 5 INJECTION INTRAVENOUS at 13:16

## 2022-02-09 RX ADMIN — ALTEPLASE 2 MG: 2.2 INJECTION, POWDER, LYOPHILIZED, FOR SOLUTION INTRAVENOUS at 12:30

## 2022-02-09 RX ADMIN — SODIUM CHLORIDE, PRESERVATIVE FREE 10 ML: 5 INJECTION INTRAVENOUS at 15:10

## 2022-02-09 RX ADMIN — HEPARIN 500 UNITS: 100 SYRINGE at 15:10

## 2022-02-09 RX ADMIN — SODIUM CHLORIDE 1000 ML: 9 INJECTION, SOLUTION INTRAVENOUS at 13:16

## 2022-02-09 NOTE — PROGRESS NOTES
Pt was seen today by Dr Shira Turner in [address] [facility] office. Triage RN notified writer that pt's port did not draw blood and cathflo was instilled. Pt was sent up to infusion room for hydration with 1 L NS. Brisk blood return obtained from port and discarded. Pt tolerated infusion without incident. Returns in 2 wks for possible Keytruda, pending lab results.

## 2022-02-09 NOTE — TELEPHONE ENCOUNTER
AVS form 2/9/22     Hold tx for 2 weeks   ivf 1 lit today   tx in 2 weeks after labs   rv with next tx   ent referral     tx held 2 weeks, rescheduled for 2/23/22. RN and [facility] notified  Hydration given today - rn and rpaman notified  Clarified with md about rv in 2 weeks and he said to see pt with tx on 3/16.  So rv appt is 3/16 @ 9 am with tx to follow   ent referral faxed ( [phone]) with pilar baez'ifeoma    Pt was given AVS and appt schedule

## 2022-02-09 NOTE — PATIENT INSTRUCTIONS
Hold tx for 2 weeks   ivf 1 lit today   tx in 2 weeks after labs   rv with next tx   ent referral Soft volume/Slowed rate/Increased latency

## 2022-02-09 NOTE — PROGRESS NOTES
Arminda Faye                                                                                                                  2/9/2022  MRN:   [id number]  YOB: 1940  PCP:                           GABE Pittman CNP  Referring Physician: No ref. provider found  Treating Physician Name: Amanda Warner MD      Reason for visit:  Chief Complaint   Patient presents with    Follow-up    Nausea     zofran helps     Fatigue   Patient presents to the clinic for toxicity check and to discuss results of lab work-up, imaging studies and further treatment plan. Current problems:  History of high-grade urothelial cancer, muscle invasive, pathological stage pT3b pN0 M0-10/2020  Lung metastasis from urothelial cancer, biopsy-proven-10/2021  History of prostate cancer-2010  CKD    Active and recent treatments:  Status post radical prostatectomy-2010 and salvage EBRT-2015  Status post radical robotic cystectomy with PLND and left nephroureterectomy with IC-1/4/2021  Keytruda-cycle 1 day 110/27/21    Summary of Case/History:    Arminda Faye a 79 y.o.male is a patient with history of hypertension prostate cancer status post prostatectomy in 2010 and salvage radiation therapy several years after was recently diagnosed with muscle invasive bladder cancer. Patient initially presented to outside hospital with complaints of flank pain and constipation. Imaging studies showed left hydronephrosis. Patient underwent cystoscopy on 10/2020 which showed a bladder tumor replacing left ureteral orifice and small tumors on the right and left lateral bladder walls. Patient underwent TURBT on 10/2/2020. Pathology revealed high-grade urothelial cancer muscle invasive. Patient underwent CT PET for staging which was negative for any distant metastasis. Patient underwent radical robotic cystectomy with PLND and left nephroureterectomy in January 2021.   Patient post surgical recovery was slow and patient was in and out of the hospital and rehab due to failure to thrive. More recently patient has moved to [place] to be closer to family, his niece. Patient is currently residing in assisted living and is now doing better. Patient does admit that he has been having memory loss and it was getting difficult for him to take care of himself at home. Patient recently had CT scans done which showed lung nodules concerning for malignancy. Patient is now establishing care in [place] and was seen by urology who referred patient to medical oncology. Patient at the time of evaluation denies any bloody urine. He has a urostomy bag. Denies shortness of breath. Denies any pain. Interim History:    Patient presents to the clinic for a follow-up visit with cycle #5 of treatment. He had some vomiting about 2 weeks ago with nausea and he received some medication which controlled it, he has lost some weight. His energy level has decreased. He feels he is tolerating treatment well overall. His niece feels his dementia is progressing and struggles with hearing loss. He denies any abdominal pain. He does not take calcium supplements. During this visit patient's allergy, social, medical, surgical history and medications were reviewed and updated.     Past Medical History:   Past Medical History:   Diagnosis Date    Bladder cancer ([address]     Chronic kidney disease     Hypertension        Past Surgical History:     Past Surgical History:   Procedure Laterality Date    BLADDER REMOVAL      CT NEEDLE BIOPSY LUNG PERCUTANEOUS  10/6/2021    CT NEEDLE BIOPSY LUNG PERCUTANEOUS 10/6/2021 [facility] CT SCAN    IR PORT PLACEMENT EQUAL OR GREATER THAN 5 YEARS  10/25/2021    IR PORT PLACEMENT EQUAL OR GREATER THAN 5 YEARS 10/25/2021 [facility]EDURES    KIDNEY REMOVAL Left     KNEE ARTHROPLASTY      LUNG BIOPSY  10/06/2021    PROSTATE SURGERY      ROTATOR CUFF REPAIR Right        Patient Family Social History:     Social History Outpatient Medications   Medication Sig Dispense Refill    ondansetron (ZOFRAN) 4 MG tablet Take 4 mg by mouth every 8 hours as needed for Nausea or Vomiting      acetaminophen (TYLENOL) 325 MG tablet Take 650 mg by mouth every 6 hours as needed for Pain      lidocaine-prilocaine (EMLA) 2.5-2.5 % cream Apply topically daily as needed prior to blood draws and treatments. (Patient not taking: Reported on 2/9/2022) 30 g 1     No current facility-administered medications for this visit. Facility-Administered Medications Ordered in Other Visits   Medication Dose Route Frequency Provider Last Rate Last Admin    alteplase (CATHFLO) injection 2 mg  2 mg IntraCATHeter Once [facility], MD        sterile water injection 2.2 mL  2.2 mL Injection Once [facility], MD           Allergies:   Codeine    Review of Systems:    Constitutional: No fever or chills. No night sweats. +weight loss  Eyes: No eye discharge, double vision, or eye pain   HEENT: negative for sore mouth, sore throat, hoarseness and voice change  +hearing loss  Respiratory: negative for cough , sputum, dyspnea, wheezing, hemoptysis, chest pain   Cardiovascular: negative for chest pain, dyspnea, palpitations, orthopnea, PND   Gastrointestinal: negative for diarrhea, constipation, abdominal pain, Dysphagia, hematemesis and hematochezia +nausea and vomiting - controlled   Genitourinary: negative for frequency, dysuria, nocturia, urinary incontinence, and hematuria   Integument: negative for rash, skin lesions, bruises. Positive for itching  Hematologic/Lymphatic: negative for easy bruising, bleeding, lymphadenopathy, or petechiae   Endocrine: negative for heat or cold intolerance,weight changes, change in bowel habits and hair loss   Musculoskeletal: negative for myalgias, arthralgias, pain, joint swelling,and bone pain   Neurological: negative for headaches, dizziness, seizures, weakness, numbness.   Positive memory loss        Physical Exam:    Vitals: /84   Pulse 78   Temp 97.5 °F (36.4 °C) (Oral)   Resp 16   Wt 135 lb 9.6 oz (61.5 kg)   BMI 21.89 kg/m²   General appearance - well appearing, no in pain or distress  Mental status - AAO X3  Eyes - pupils equal and reactive, extraocular eye movements intact  Mouth - mucous membranes moist, pharynx normal without lesions  Neck - supple, no significant adenopathy  Lymphatics - no palpable lymphadenopathy, no hepatosplenomegaly  Chest - clear to auscultation, no wheezes, rales or rhonchi, symmetric air entry  Heart - normal rate, regular rhythm, normal S1, S2, no murmurs  Abdomen - soft, nontender, nondistended, no masses or organomegaly. urostomy bag in place  Neurological - alert, oriented, normal speech, no focal findings or movement disorder noted  Extremities - peripheral pulses normal, no pedal edema, no clubbing or cyanosis  Skin - normal coloration and turgor, no rashes, no suspicious skin lesions noted       DATA:    Lab Results   Component Value Date    WBC 11.1 (H) 02/09/2022    HGB 16.0 02/09/2022    HCT 49.6 02/09/2022    MCV 88.0 02/09/2022     02/09/2022       Chemistry        Component Value Date/Time     (L) 02/09/2022 1201    K 5.5 (H) 02/09/2022 1201     02/09/2022 1201    CO2 15 (L) 02/09/2022 1201    BUN 51 (H) 02/09/2022 1201    CREATININE 1.95 (H) 02/09/2022 1201        Component Value Date/Time    CALCIUM 11.4 (H) 02/09/2022 1201    ALKPHOS 183 (H) 02/09/2022 1201    AST 19 02/09/2022 1201    ALT 24 02/09/2022 1201    BILITOT 0.34 02/09/2022 1201            Impression:  History of high-grade urothelial cancer, muscle invasive, pathological stage pT3b pN0 M0-10/2010. Status post radical robotic cystectomy with PLND and left nephroureterectomy with IC-1/4/2021  Pulmonary metastasis, biopsy-proven-10/2021  Keytruda-cycle 1 day 110/27/21  History of prostate cancer-2010. Status post radical prostatectomy-2010 and salvage EBRT-2015  CKD     Plan:   For his hearing loss I am referring to ENT for evaluation and intervention. I completed toxicity check. He has some nausea with vomiting resulting in weight loss. His lab work was reviewed, counts are stable, some worsening of renal function and his electrolytes show some dehydration, lipase is elevated showing some possible treatment induced pancreatitis. We will hold treatment today and plan for hydration today and monitor. plan to start back on tx in 2 weeks after nurse eval. RV with the following cycle    NCCN guidelines were reviewed and discussed with the patient. The diagnosis and care plan were discussed with the patient in detail. I discussed the natural history of the disease, prognosis, risks and goals of therapy and answered all the patients questions to the best of my ability. Patient expressed understanding and was in agreement. Scribe Attestation   This note was created by Deane Meigs acting as scribe for the physician signing this note  Electronically Signed  Anahi Vázquezr, 2/9/2022  Scribe, [employer]. Attending Attestation   Note was reviewed and edited. I am in agreement with the note as entered      Ulysses Hicks MD          I spent more than 40 minutes examining, evaluating, reviewing data, counseling the patient and coordinating care. Greater than 50% of time was spent face-to-face with the patient this note is created with the assistance of a speech recognition program.  While intending to generate a document that actually reflects the content of the visit, the document can still have some errors including those of syntax and sound a like substitutions which may escape proof reading. It such instances, actual meaning can be extrapolated by contextual diversion.

## 2022-02-23 ENCOUNTER — HOSPITAL ENCOUNTER (OUTPATIENT)
Dept: INFUSION THERAPY | Age: 82
Discharge: HOME | End: 2022-02-23
Payer: MEDICARE

## 2022-02-23 VITALS
WEIGHT: 138.8 LBS | BODY MASS INDEX: 22.4 KG/M2 | SYSTOLIC BLOOD PRESSURE: 120 MMHG | RESPIRATION RATE: 16 BRPM | HEART RATE: 96 BPM | DIASTOLIC BLOOD PRESSURE: 73 MMHG | TEMPERATURE: 97.3 F

## 2022-02-23 DIAGNOSIS — Z85.46 HISTORY OF PROSTATE CANCER: ICD-10-CM

## 2022-02-23 DIAGNOSIS — R91.8 LUNG NODULES: ICD-10-CM

## 2022-02-23 DIAGNOSIS — C67.9 MALIGNANT NEOPLASM OF URINARY BLADDER, UNSPECIFIED SITE (HCC): Primary | ICD-10-CM

## 2022-02-23 DIAGNOSIS — Z85.51 HISTORY OF BLADDER CANCER: ICD-10-CM

## 2022-02-23 LAB
ABSOLUTE EOS #: 0.4 K/UL (ref 0–0.4)
ABSOLUTE LYMPH #: 0.8 K/UL (ref 1–4.8)
ABSOLUTE MONO #: 1.2 K/UL (ref 0.1–1.2)
ALBUMIN SERPL-MCNC: 3.4 G/DL (ref 3.5–5.2)
ALBUMIN/GLOBULIN RATIO: 1 (ref 1–2.5)
ALP BLD-CCNC: 183 U/L (ref 40–129)
ALT SERPL-CCNC: 24 U/L (ref 5–41)
AMYLASE: 204 U/L (ref 28–100)
ANION GAP SERPL CALCULATED.3IONS-SCNC: 11 MMOL/L (ref 9–17)
AST SERPL-CCNC: 20 U/L
BASOPHILS # BLD: 1 % (ref 0–2)
BASOPHILS ABSOLUTE: 0.1 K/UL (ref 0–0.2)
BILIRUB SERPL-MCNC: 0.35 MG/DL (ref 0.3–1.2)
BUN BLDV-MCNC: 47 MG/DL (ref 8–23)
CALCIUM SERPL-MCNC: 11.2 MG/DL (ref 8.6–10.4)
CHLORIDE BLD-SCNC: 106 MMOL/L (ref 98–107)
CO2: 16 MMOL/L (ref 20–31)
CORTISOL: 12.5 UG/DL (ref 2.7–18.4)
CREAT SERPL-MCNC: 1.74 MG/DL (ref 0.7–1.2)
EOSINOPHILS RELATIVE PERCENT: 4 % (ref 1–4)
GFR AFRICAN AMERICAN: 46 ML/MIN
GFR NON-AFRICAN AMERICAN: 38 ML/MIN
GFR SERPL CREATININE-BSD FRML MDRD: ABNORMAL ML/MIN/{1.73_M2}
GLUCOSE BLD-MCNC: 106 MG/DL (ref 70–99)
HCT VFR BLD CALC: 46.3 % (ref 41–53)
HEMOGLOBIN: 15.1 G/DL (ref 13.5–17.5)
LIPASE: 386 U/L (ref 13–60)
LYMPHOCYTES # BLD: 8 % (ref 24–44)
MCH RBC QN AUTO: 29.1 PG (ref 26–34)
MCHC RBC AUTO-ENTMCNC: 32.5 G/DL (ref 31–37)
MCV RBC AUTO: 89.5 FL (ref 80–100)
MONOCYTES # BLD: 12 % (ref 2–11)
MORPHOLOGY: NORMAL
PDW BLD-RTO: 16 % (ref 12.5–15.4)
PLATELET # BLD: 301 K/UL (ref 140–450)
PMV BLD AUTO: 7 FL (ref 6–12)
POTASSIUM SERPL-SCNC: 4.7 MMOL/L (ref 3.7–5.3)
RBC # BLD: 5.17 M/UL (ref 4.5–5.9)
SEG NEUTROPHILS: 75 % (ref 36–66)
SEGMENTED NEUTROPHILS ABSOLUTE COUNT: 7.5 K/UL (ref 1.8–7.7)
SODIUM BLD-SCNC: 133 MMOL/L (ref 135–144)
TOTAL PROTEIN: 6.7 G/DL (ref 6.4–8.3)
TSH SERPL DL<=0.05 MIU/L-ACNC: 2.02 MIU/L (ref 0.3–5)
WBC # BLD: 10 K/UL (ref 3.5–11)

## 2022-02-23 PROCEDURE — 83690 ASSAY OF LIPASE: CPT

## 2022-02-23 PROCEDURE — 82150 ASSAY OF AMYLASE: CPT

## 2022-02-23 PROCEDURE — 6360000002 HC RX W HCPCS: Performed by: INTERNAL MEDICINE

## 2022-02-23 PROCEDURE — 36591 DRAW BLOOD OFF VENOUS DEVICE: CPT

## 2022-02-23 PROCEDURE — 80053 COMPREHEN METABOLIC PANEL: CPT

## 2022-02-23 PROCEDURE — 85025 COMPLETE CBC W/AUTO DIFF WBC: CPT

## 2022-02-23 PROCEDURE — 96413 CHEMO IV INFUSION 1 HR: CPT

## 2022-02-23 PROCEDURE — 82533 TOTAL CORTISOL: CPT

## 2022-02-23 PROCEDURE — 2580000003 HC RX 258: Performed by: INTERNAL MEDICINE

## 2022-02-23 PROCEDURE — 84443 ASSAY THYROID STIM HORMONE: CPT

## 2022-02-23 RX ORDER — SODIUM CHLORIDE 9 MG/ML
25 INJECTION, SOLUTION INTRAVENOUS PRN
Status: CANCELLED | OUTPATIENT
Start: 2022-02-23

## 2022-02-23 RX ORDER — SODIUM CHLORIDE 9 MG/ML
20 INJECTION, SOLUTION INTRAVENOUS ONCE
Status: COMPLETED | OUTPATIENT
Start: 2022-02-23 | End: 2022-02-23

## 2022-02-23 RX ORDER — SODIUM CHLORIDE 0.9 % (FLUSH) 0.9 %
5-40 SYRINGE (ML) INJECTION PRN
Status: DISCONTINUED | OUTPATIENT
Start: 2022-02-23 | End: 2022-02-24 | Stop reason: HOSPADM

## 2022-02-23 RX ORDER — HEPARIN SODIUM (PORCINE) LOCK FLUSH IV SOLN 100 UNIT/ML 100 UNIT/ML
500 SOLUTION INTRAVENOUS PRN
Status: DISCONTINUED | OUTPATIENT
Start: 2022-02-23 | End: 2022-02-24 | Stop reason: HOSPADM

## 2022-02-23 RX ADMIN — SODIUM CHLORIDE 20 ML/HR: 9 INJECTION, SOLUTION INTRAVENOUS at 15:17

## 2022-02-23 RX ADMIN — SODIUM CHLORIDE, PRESERVATIVE FREE 10 ML: 5 INJECTION INTRAVENOUS at 16:17

## 2022-02-23 RX ADMIN — SODIUM CHLORIDE 200 MG: 9 INJECTION, SOLUTION INTRAVENOUS at 15:36

## 2022-02-23 RX ADMIN — SODIUM CHLORIDE, PRESERVATIVE FREE 10 ML: 5 INJECTION INTRAVENOUS at 14:28

## 2022-02-23 RX ADMIN — HEPARIN 500 UNITS: 100 SYRINGE at 16:17

## 2022-02-23 RX ADMIN — SODIUM CHLORIDE, PRESERVATIVE FREE 10 ML: 5 INJECTION INTRAVENOUS at 14:27

## 2022-02-23 NOTE — FLOWSHEET NOTE
SPIRITUAL CARE PROGRESS NOTE: Outpatient Oncology Care at [facility]    Spiritual Assessment: Mr. Rebekah Crockett was in the treatment cubicle and was accompanied by his niece, Sukhdeep Mazariegos. They smiled and greeted writer. They shared that Pt has difficulty hearing. They talked about Pt's recent relocation back to his hometown to be close to family. Niece acknowledged her feelings about her Uncle's relocation and also the importance of him being close to family. Patient agreed. Patient talked about aspects of his life, where he went to school, the work he did, and where he lived. Pt reflected on his values and beliefs. Niece and Pt talked about family members, including their Mother/Sister who passed away 15 years ago. Patient and Niece acknowledged and affirmed each other. Intervention: Writer provided supportive presence and active listening; inquired about Pt's sources of support and strength; offered words of encouragement; facilitated story telling; affirmed Pt and Family. Outcome: Niece thanked writer for stopping. Patient's RN entered to provide care. Plan: Chaplains will remain available to provide emotional and spiritual support as needed. 02/23/22 1616   Encounter Summary   Services provided to: Patient and family together   Referral/Consult From: [facility] Family members   Continue Visiting   (2/23/22)   Complexity of Encounter Moderate   Length of Encounter 15 minutes   Spiritual Assessment Completed Yes   Routine   Type Initial   Spiritual/Mormonism   Type Spiritual support   Assessment Calm; Approachable   Intervention Active listening;Explored feelings, thoughts, concerns;Explored coping resources;Sustaining presence/ Ministry of presence; Discussed illness/injury and it's impact; Discussed belief system/Yazidi practices/jeff;Discussed meaning/purpose   Outcome Receptive; Hopeful;Encouraged;Expressed feelings/needs/concerns;Engaged in conversation;Coping;Expressed gratitude Electronically signed by Camron Toure, Oncology [facility], [address] [facility] Oncology  2/23/2022  4:17 PM

## 2022-02-23 NOTE — PROGRESS NOTES
Patient here for [facility]. Labs reviewed w/ Dr. Paul Kilpatrick, ok to treat today. Instructed patient and niece if n/v or abd pain occurs, call the office to get patient in for hydration per Dr. Paul Kilpatrick. Tolerated w/o incident and left in stable condition. Returns 3/16

## 2022-03-16 ENCOUNTER — TELEPHONE (OUTPATIENT)
Dept: ONCOLOGY | Age: 82
End: 2022-03-16

## 2022-03-16 ENCOUNTER — HOSPITAL ENCOUNTER (OUTPATIENT)
Dept: INFUSION THERAPY | Age: 82
Discharge: HOME | End: 2022-03-16
Payer: MEDICARE

## 2022-03-16 ENCOUNTER — OFFICE VISIT (OUTPATIENT)
Dept: ONCOLOGY | Age: 82
End: 2022-03-16
Payer: MEDICARE

## 2022-03-16 VITALS
TEMPERATURE: 97.1 F | SYSTOLIC BLOOD PRESSURE: 123 MMHG | DIASTOLIC BLOOD PRESSURE: 76 MMHG | HEART RATE: 101 BPM | BODY MASS INDEX: 22.14 KG/M2 | WEIGHT: 137.2 LBS

## 2022-03-16 DIAGNOSIS — Z85.46 HISTORY OF PROSTATE CANCER: ICD-10-CM

## 2022-03-16 DIAGNOSIS — C67.9 MALIGNANT NEOPLASM OF URINARY BLADDER, UNSPECIFIED SITE (HCC): Primary | ICD-10-CM

## 2022-03-16 DIAGNOSIS — R54 ADVANCED AGE: ICD-10-CM

## 2022-03-16 DIAGNOSIS — H91.90 HEARING LOSS, UNSPECIFIED HEARING LOSS TYPE, UNSPECIFIED LATERALITY: ICD-10-CM

## 2022-03-16 DIAGNOSIS — R91.8 LUNG NODULES: ICD-10-CM

## 2022-03-16 DIAGNOSIS — Z85.51 HISTORY OF BLADDER CANCER: ICD-10-CM

## 2022-03-16 DIAGNOSIS — N18.9 CHRONIC KIDNEY DISEASE, UNSPECIFIED CKD STAGE: ICD-10-CM

## 2022-03-16 LAB
ABSOLUTE EOS #: 0.49 K/UL (ref 0–0.4)
ABSOLUTE LYMPH #: 1.08 K/UL (ref 1–4.8)
ABSOLUTE MONO #: 0.88 K/UL (ref 0.1–0.8)
ALBUMIN SERPL-MCNC: 3.4 G/DL (ref 3.5–5.2)
ALBUMIN/GLOBULIN RATIO: 1 (ref 1–2.5)
ALP BLD-CCNC: 191 U/L (ref 40–129)
ALT SERPL-CCNC: 23 U/L (ref 5–41)
AMYLASE: 160 U/L (ref 28–100)
ANION GAP SERPL CALCULATED.3IONS-SCNC: 12 MMOL/L (ref 9–17)
AST SERPL-CCNC: 19 U/L
BASOPHILS # BLD: 0 % (ref 0–2)
BASOPHILS ABSOLUTE: 0 K/UL (ref 0–0.2)
BILIRUB SERPL-MCNC: 0.28 MG/DL (ref 0.3–1.2)
BUN BLDV-MCNC: 50 MG/DL (ref 8–23)
CALCIUM SERPL-MCNC: 10.2 MG/DL (ref 8.6–10.4)
CHLORIDE BLD-SCNC: 112 MMOL/L (ref 98–107)
CO2: 12 MMOL/L (ref 20–31)
CORTISOL COLLECTION INFO: NORMAL
CORTISOL: 15.5 UG/DL (ref 2.7–18.4)
CREAT SERPL-MCNC: 1.84 MG/DL (ref 0.7–1.2)
EOSINOPHILS RELATIVE PERCENT: 5 % (ref 1–4)
GFR AFRICAN AMERICAN: 43 ML/MIN
GFR NON-AFRICAN AMERICAN: 35 ML/MIN
GFR SERPL CREATININE-BSD FRML MDRD: ABNORMAL ML/MIN/{1.73_M2}
GLUCOSE BLD-MCNC: 102 MG/DL (ref 70–99)
HCT VFR BLD CALC: 45.6 % (ref 41–53)
HEMOGLOBIN: 14.7 G/DL (ref 13.5–17.5)
LIPASE: 220 U/L (ref 13–60)
LYMPHOCYTES # BLD: 11 % (ref 24–44)
MCH RBC QN AUTO: 29 PG (ref 26–34)
MCHC RBC AUTO-ENTMCNC: 32.2 G/DL (ref 31–37)
MCV RBC AUTO: 90.1 FL (ref 80–100)
MONOCYTES # BLD: 9 % (ref 1–7)
MORPHOLOGY: NORMAL
PDW BLD-RTO: 16.7 % (ref 12.5–15.4)
PLATELET # BLD: 319 K/UL (ref 140–450)
PMV BLD AUTO: 6.5 FL (ref 6–12)
POTASSIUM SERPL-SCNC: 4.3 MMOL/L (ref 3.7–5.3)
PROSTATE SPECIFIC ANTIGEN: <0.02 UG/L
RBC # BLD: 5.06 M/UL (ref 4.5–5.9)
SEG NEUTROPHILS: 75 % (ref 36–66)
SEGMENTED NEUTROPHILS ABSOLUTE COUNT: 7.35 K/UL (ref 1.8–7.7)
SODIUM BLD-SCNC: 136 MMOL/L (ref 135–144)
TOTAL PROTEIN: 6.8 G/DL (ref 6.4–8.3)
TSH SERPL DL<=0.05 MIU/L-ACNC: 3.14 MIU/L (ref 0.3–5)
WBC # BLD: 9.8 K/UL (ref 3.5–11)

## 2022-03-16 PROCEDURE — G8420 CALC BMI NORM PARAMETERS: HCPCS | Performed by: INTERNAL MEDICINE

## 2022-03-16 PROCEDURE — 4040F PNEUMOC VAC/ADMIN/RCVD: CPT | Performed by: INTERNAL MEDICINE

## 2022-03-16 PROCEDURE — 96413 CHEMO IV INFUSION 1 HR: CPT

## 2022-03-16 PROCEDURE — 82150 ASSAY OF AMYLASE: CPT

## 2022-03-16 PROCEDURE — 6360000002 HC RX W HCPCS: Performed by: INTERNAL MEDICINE

## 2022-03-16 PROCEDURE — 82533 TOTAL CORTISOL: CPT

## 2022-03-16 PROCEDURE — 99215 OFFICE O/P EST HI 40 MIN: CPT | Performed by: INTERNAL MEDICINE

## 2022-03-16 PROCEDURE — 85025 COMPLETE CBC W/AUTO DIFF WBC: CPT

## 2022-03-16 PROCEDURE — 84153 ASSAY OF PSA TOTAL: CPT

## 2022-03-16 PROCEDURE — 84443 ASSAY THYROID STIM HORMONE: CPT

## 2022-03-16 PROCEDURE — 1123F ACP DISCUSS/DSCN MKR DOCD: CPT | Performed by: INTERNAL MEDICINE

## 2022-03-16 PROCEDURE — 1036F TOBACCO NON-USER: CPT | Performed by: INTERNAL MEDICINE

## 2022-03-16 PROCEDURE — 83690 ASSAY OF LIPASE: CPT

## 2022-03-16 PROCEDURE — 36591 DRAW BLOOD OFF VENOUS DEVICE: CPT

## 2022-03-16 PROCEDURE — 80053 COMPREHEN METABOLIC PANEL: CPT

## 2022-03-16 PROCEDURE — G8484 FLU IMMUNIZE NO ADMIN: HCPCS | Performed by: INTERNAL MEDICINE

## 2022-03-16 PROCEDURE — 2580000003 HC RX 258: Performed by: INTERNAL MEDICINE

## 2022-03-16 PROCEDURE — G8427 DOCREV CUR MEDS BY ELIG CLIN: HCPCS | Performed by: INTERNAL MEDICINE

## 2022-03-16 RX ORDER — SODIUM CHLORIDE 9 MG/ML
20 INJECTION, SOLUTION INTRAVENOUS ONCE
Status: CANCELLED | OUTPATIENT
Start: 2022-04-08 | End: 2022-04-06

## 2022-03-16 RX ORDER — EPINEPHRINE 1 MG/ML
0.3 INJECTION, SOLUTION, CONCENTRATE INTRAVENOUS PRN
Status: CANCELLED | OUTPATIENT
Start: 2022-03-16

## 2022-03-16 RX ORDER — SODIUM CHLORIDE 0.9 % (FLUSH) 0.9 %
5-40 SYRINGE (ML) INJECTION PRN
Status: DISCONTINUED | OUTPATIENT
Start: 2022-03-16 | End: 2022-03-17 | Stop reason: HOSPADM

## 2022-03-16 RX ORDER — METHYLPREDNISOLONE SODIUM SUCCINATE 125 MG/2ML
125 INJECTION, POWDER, LYOPHILIZED, FOR SOLUTION INTRAMUSCULAR; INTRAVENOUS ONCE
Status: CANCELLED | OUTPATIENT
Start: 2022-04-08 | End: 2022-04-06

## 2022-03-16 RX ORDER — SODIUM CHLORIDE 9 MG/ML
25 INJECTION, SOLUTION INTRAVENOUS PRN
Status: DISCONTINUED | OUTPATIENT
Start: 2022-03-16 | End: 2022-03-17 | Stop reason: HOSPADM

## 2022-03-16 RX ORDER — MEPERIDINE HYDROCHLORIDE 50 MG/ML
12.5 INJECTION INTRAMUSCULAR; INTRAVENOUS; SUBCUTANEOUS ONCE
Status: CANCELLED | OUTPATIENT
Start: 2022-03-16 | End: 2022-03-16

## 2022-03-16 RX ORDER — HEPARIN SODIUM (PORCINE) LOCK FLUSH IV SOLN 100 UNIT/ML 100 UNIT/ML
500 SOLUTION INTRAVENOUS PRN
Status: CANCELLED | OUTPATIENT
Start: 2022-04-08

## 2022-03-16 RX ORDER — MEPERIDINE HYDROCHLORIDE 50 MG/ML
12.5 INJECTION INTRAMUSCULAR; INTRAVENOUS; SUBCUTANEOUS ONCE
Status: CANCELLED | OUTPATIENT
Start: 2022-04-08 | End: 2022-04-06

## 2022-03-16 RX ORDER — SODIUM CHLORIDE 9 MG/ML
INJECTION, SOLUTION INTRAVENOUS CONTINUOUS
Status: CANCELLED | OUTPATIENT
Start: 2022-03-16

## 2022-03-16 RX ORDER — SODIUM CHLORIDE 0.9 % (FLUSH) 0.9 %
5-40 SYRINGE (ML) INJECTION PRN
Status: CANCELLED | OUTPATIENT
Start: 2022-04-08

## 2022-03-16 RX ORDER — DIPHENHYDRAMINE HYDROCHLORIDE 50 MG/ML
50 INJECTION INTRAMUSCULAR; INTRAVENOUS ONCE
Status: CANCELLED | OUTPATIENT
Start: 2022-04-08 | End: 2022-04-06

## 2022-03-16 RX ORDER — SODIUM CHLORIDE 9 MG/ML
25 INJECTION, SOLUTION INTRAVENOUS PRN
Status: CANCELLED | OUTPATIENT
Start: 2022-04-08

## 2022-03-16 RX ORDER — SODIUM CHLORIDE 9 MG/ML
INJECTION, SOLUTION INTRAVENOUS CONTINUOUS
Status: CANCELLED | OUTPATIENT
Start: 2022-04-08

## 2022-03-16 RX ORDER — METHYLPREDNISOLONE SODIUM SUCCINATE 125 MG/2ML
125 INJECTION, POWDER, LYOPHILIZED, FOR SOLUTION INTRAMUSCULAR; INTRAVENOUS ONCE
Status: CANCELLED | OUTPATIENT
Start: 2022-03-16 | End: 2022-03-16

## 2022-03-16 RX ORDER — HEPARIN SODIUM (PORCINE) LOCK FLUSH IV SOLN 100 UNIT/ML 100 UNIT/ML
500 SOLUTION INTRAVENOUS PRN
Status: CANCELLED | OUTPATIENT
Start: 2022-03-16

## 2022-03-16 RX ORDER — DIPHENHYDRAMINE HYDROCHLORIDE 50 MG/ML
50 INJECTION INTRAMUSCULAR; INTRAVENOUS ONCE
Status: CANCELLED | OUTPATIENT
Start: 2022-03-16 | End: 2022-03-16

## 2022-03-16 RX ORDER — SODIUM CHLORIDE 0.9 % (FLUSH) 0.9 %
5-40 SYRINGE (ML) INJECTION PRN
Status: CANCELLED | OUTPATIENT
Start: 2022-03-16

## 2022-03-16 RX ORDER — HEPARIN SODIUM (PORCINE) LOCK FLUSH IV SOLN 100 UNIT/ML 100 UNIT/ML
500 SOLUTION INTRAVENOUS PRN
Status: DISCONTINUED | OUTPATIENT
Start: 2022-03-16 | End: 2022-03-17 | Stop reason: HOSPADM

## 2022-03-16 RX ORDER — SODIUM CHLORIDE 9 MG/ML
25 INJECTION, SOLUTION INTRAVENOUS PRN
Status: CANCELLED | OUTPATIENT
Start: 2022-03-16

## 2022-03-16 RX ORDER — SODIUM CHLORIDE 9 MG/ML
20 INJECTION, SOLUTION INTRAVENOUS ONCE
Status: CANCELLED | OUTPATIENT
Start: 2022-03-16 | End: 2022-03-16

## 2022-03-16 RX ORDER — EPINEPHRINE 1 MG/ML
0.3 INJECTION, SOLUTION, CONCENTRATE INTRAVENOUS PRN
Status: CANCELLED | OUTPATIENT
Start: 2022-04-08

## 2022-03-16 RX ADMIN — SODIUM CHLORIDE, PRESERVATIVE FREE 20 ML: 5 INJECTION INTRAVENOUS at 09:06

## 2022-03-16 RX ADMIN — HEPARIN 500 UNITS: 100 SYRINGE at 10:47

## 2022-03-16 RX ADMIN — SODIUM CHLORIDE 200 MG: 9 INJECTION, SOLUTION INTRAVENOUS at 10:10

## 2022-03-16 RX ADMIN — SODIUM CHLORIDE, PRESERVATIVE FREE 10 ML: 5 INJECTION INTRAVENOUS at 10:47

## 2022-03-16 RX ADMIN — SODIUM CHLORIDE 250 ML: 9 INJECTION, SOLUTION INTRAVENOUS at 10:04

## 2022-03-16 NOTE — PROGRESS NOTES
Apollo Irving                                                                                                                  3/16/2022  MRN:   [id number]  YOB: 1940  PCP:                           GABE Fernandez CNP  Referring Physician: No ref. provider found  Treating Physician Name: Ulysses Hicks MD      Reason for visit:  Chief Complaint   Patient presents with    Follow-up     review status of disease    Results     blood work was off   Toxicity check. Discussed treatment plan. 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 Patient initially presented to outside hospital with complaints of flank pain and constipation. Imaging studies showed left hydronephrosis. Patient underwent cystoscopy on 10/2020 which showed a bladder tumor replacing left ureteral orifice and small tumors on the right and left lateral bladder walls. Patient underwent TURBT on 10/2/2020. Pathology revealed high-grade urothelial cancer muscle invasive. Patient underwent CT PET for staging which was negative for any distant metastasis. Patient underwent radical robotic cystectomy with PLND and left nephroureterectomy in January 2021. Patient post surgical recovery was slow and patient was in and out of the hospital and rehab due to failure to thrive.   More recently patient has moved to [facility] to be closer to family, his niece. Patient is currently residing in assisted living and is now doing better. Patient does admit that he has been having memory loss and it was getting difficult for him to take care of himself at home. Patient recently had CT scans done which showed lung nodules concerning for malignancy. Patient is now establishing care in [facility] and was seen by urology who referred patient to medical oncology. Patient at the time of evaluation denies any bloody urine. He has a urostomy bag. Denies shortness of breath. Denies any pain. Interim History:    Patient presents to the clinic for a follow-up visit with cycle #7 of treatment. His weight has stabilized. He continues with his daily routine with no limitations, his niece has noticed some mild decline in energy. He has had ear wax removal ahead of hearing test. He has nephrology consult in 2 weeks. He has not had recurrent nausea or vomiting. Continues to tolerate treatment well. During this visit patient's allergy, social, medical, surgical history and medications were reviewed and updated.     Past Medical History:   Past Medical History:   Diagnosis Date    Bladder cancer ([address]     Chronic kidney disease     Hypertension        Past Surgical History:     Past Surgical History:   Procedure Laterality Date    BLADDER REMOVAL      CT NEEDLE BIOPSY LUNG PERCUTANEOUS  10/6/2021    CT NEEDLE BIOPSY LUNG PERCUTANEOUS 10/6/2021 [facility] CT SCAN    IR PORT PLACEMENT EQUAL OR GREATER THAN 5 YEARS  10/25/2021    IR PORT PLACEMENT EQUAL OR GREATER THAN 5 YEARS 10/25/2021 [facility] SPECIAL PROCEDURES    KIDNEY REMOVAL Left     KNEE ARTHROPLASTY      LUNG BIOPSY  10/06/2021    PROSTATE SURGERY      ROTATOR CUFF REPAIR Right        Patient Family Social History:     Social History     Socioeconomic History    Marital status: Single     Spouse name: None    Number of children: None    Years of education: None    Highest education level: None   Occupational History    None   Tobacco Use    Smoking status: Never Smoker    Smokeless tobacco: Never Used   Substance and Sexual Activity    Alcohol use: Not Currently    Drug use: Never    Sexual activity: None   Other Topics Concern    None   Social History Narrative    None     Social Determinants of Health     Financial Resource Strain:     Difficulty of Paying Living Expenses: Not on file   Food Insecurity:     Worried About Running Out of Food in the Last Year: Not on file    Lance of Food in the Last Year: Not on file   Transportation Needs:     Lack of Transportation (Medical): Not on file    Lack of Transportation (Non-Medical):  Not on file   Physical Activity:     Days of Exercise per Week: Not on file    Minutes of Exercise per Session: Not on file   Stress:     Feeling of Stress : Not on file   Social Connections:     Frequency of Communication with Friends and Family: Not on file    Frequency of Social Gatherings with Friends and Family: Not on file    Attends Restoration Services: Not on file    Active Member of [address] [place] [address] or Organizations: Not on file    Attends Club or Organization Meetings: Not on file    Marital Status: Not on file   Intimate Partner Violence:     Fear of Current or Ex-Partner: Not on file    Emotionally Abused: Not on file    Physically Abused: Not on file    Sexually Abused: Not on file   Housing Stability:     Unable to Pay for Housing in the Last Year: Not on file    Number of [place] in the Last Year: Not on file    Unstable Housing in the Last Year: Not on file     Family History   Problem Relation Age of Onset    Heart Disease Mother     Stroke Father     Breast Cancer Father     Other Sister         ALS    Heart Disease Brother      Current Medications:     Current Outpatient Medications   Medication Sig Dispense Refill    ondansetron (ZOFRAN) 4 MG tablet Take 4 mg by mouth every 8 hours as needed for Nausea or Vomiting      acetaminophen (TYLENOL) 325 MG tablet Take 650 mg by mouth every 6 hours as needed for Pain      lidocaine-prilocaine (EMLA) 2.5-2.5 % cream Apply topically daily as needed prior to blood draws and treatments. (Patient not taking: Reported on 2/9/2022) 30 g 1     No current facility-administered medications for this visit. Allergies:   Codeine    Review of Systems:    Constitutional: No fever or chills. No night sweats. +weight stabilized   Eyes: No eye discharge, double vision, or eye pain   HEENT: negative for sore mouth, sore throat, hoarseness and voice change  +hearing loss  Respiratory: negative for cough , sputum, dyspnea, wheezing, hemoptysis, chest pain   Cardiovascular: negative for chest pain, dyspnea, palpitations, orthopnea, PND   Gastrointestinal: negative for diarrhea, constipation, abdominal pain, Dysphagia, hematemesis and hematochezia +nausea and vomiting - resolved   Genitourinary: negative for frequency, dysuria, nocturia, urinary incontinence, and hematuria   Integument: negative for rash, skin lesions, bruises. Positive for itching  Hematologic/Lymphatic: negative for easy bruising, bleeding, lymphadenopathy, or petechiae   Endocrine: negative for heat or cold intolerance,weight changes, change in bowel habits and hair loss   Musculoskeletal: negative for myalgias, arthralgias, pain, joint swelling,and bone pain   Neurological: negative for headaches, dizziness, seizures, weakness, numbness.   Positive memory loss        Physical Exam:    Vitals: /76   Pulse 101   Temp 97.1 °F (36.2 °C) (Temporal)   Wt 137 lb 3.2 oz (62.2 kg)   BMI 22.14 kg/m²   General appearance - well appearing, no in pain or distress  Mental status - AAO X3  Eyes - pupils equal and reactive, extraocular eye movements intact  Mouth - mucous membranes moist, pharynx normal without lesions  Neck - supple, no significant adenopathy  Lymphatics - no palpable lymphadenopathy, no hepatosplenomegaly  Chest - clear to auscultation, no wheezes, rales or rhonchi, symmetric air entry  Heart - normal rate, regular rhythm, normal S1, S2, no murmurs  Abdomen - soft, nontender, nondistended, no masses or organomegaly. urostomy bag in place  Neurological - alert, oriented, normal speech, no focal findings or movement disorder noted  Extremities - peripheral pulses normal, no pedal edema, no clubbing or cyanosis  Skin - normal coloration and turgor, no rashes, no suspicious skin lesions noted       DATA:    Lab Results   Component Value Date    WBC 9.8 03/16/2022    HGB 14.7 03/16/2022    HCT 45.6 03/16/2022    MCV 90.1 03/16/2022     03/16/2022       Chemistry        Component Value Date/Time     03/16/2022 0906    K 4.3 03/16/2022 0906     (H) 03/16/2022 0906    CO2 12 (L) 03/16/2022 0906    BUN 50 (H) 03/16/2022 0906    CREATININE 1.84 (H) 03/16/2022 0906        Component Value Date/Time    CALCIUM 10.2 03/16/2022 0906    ALKPHOS 191 (H) 03/16/2022 0906    AST 19 03/16/2022 0906    ALT 23 03/16/2022 0906    BILITOT 0.28 (L) 03/16/2022 0906            Impression:  History of high-grade urothelial cancer, muscle invasive, pathological stage pT3b pN0 M0-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 Status post radical robotic cystectomy with PLND and left nephroureterectomy with IC-1/4/2021  Pulmonary metastasis, biopsy-proven-10/2021  Keytruda-cycle 1 day 110/27/21  History of prostate cancer-2010. Status post radical prostatectomy-2010 and salvage EBRT-2015  CKD     Plan:  His lab work was reviewed, counts in range, electrolytes adequate, kidney function continues to fluctuate. He will be consulting with nephrology for management. I completed toxicity check. Clinically, the patient is doing well and continues to tolerate treatment with no unexpected effects. I reviewed plan to continue treatment to progression and interim imaging to assess.   Reiterated logistics prognostic data and potential side effects of ongoing treatment. Continue surveillance for prostate cancer. We will treat today as per orders. Patient has solitary kidney. Closely monitor kidney function. Patient did not have abdominal pain or nausea the last treatment. We will continue to monitor for any more episodes of pancreatitis  Return in 3 weeks. Patient's conditions were taken into consideration when deciding risk-benefit for therapy. Patient is at high risk for drug interaction risk of complications. Given multiple comorbid conditions patient is at high risk for complication from intervention, risk of hospitalization, medical interaction overall life expectancy. NCCN guidelines were reviewed and discussed with the patient. The diagnosis and care plan were discussed with the patient in detail. I discussed the natural history of the disease, prognosis, risks and goals of therapy and answered all the patients questions to the best of my ability. Patient expressed understanding and was in agreement. Scribe Attestation   This note was created by Rachel Kerr acting as scribe for the physician signing this note  Electronically Signed  Rachel Kerr, 3/16/2022  Scribe, [employer]. Attending Attestation   Note was reviewed and edited. I am in agreement with the note as entered      Gabrielle Edmond MD            This note is created with the assistance of a speech recognition program.  While intending to generate a document that actually reflects the content of the visit, the document can still have some errors including those of syntax and sound a like substitutions which may escape proof reading. It such instances, actual meaning can be extrapolated by contextual diversion.

## 2022-03-16 NOTE — ONCOLOGY
Patient arrived for [address] [facility].. Pt had visit with Dr. Sandy Vergara, labs reviewed and within treatable limits, will proceed with treatment today. Treatment completed without issue. Pt stable at discharge. Scheduled to return 4/8/22 for MD visit and C8D1.

## 2022-03-16 NOTE — TELEPHONE ENCOUNTER
tx today  rv in 3 weeks    Tx today as scheduled    RV scheduled 4/8/22 @ 12:15pm w/ tx to follow    PT was given AVS and an appt schedule    Electronically signed by Anne Blakely on 3/16/2022 at 11:53 AM

## 2022-04-08 ENCOUNTER — OFFICE VISIT (OUTPATIENT)
Dept: ONCOLOGY | Age: 82
End: 2022-04-08
Payer: MEDICARE

## 2022-04-08 ENCOUNTER — HOSPITAL ENCOUNTER (OUTPATIENT)
Dept: INFUSION THERAPY | Age: 82
Discharge: HOME | End: 2022-04-08
Payer: MEDICARE

## 2022-04-08 ENCOUNTER — TELEPHONE (OUTPATIENT)
Dept: ONCOLOGY | Age: 82
End: 2022-04-08

## 2022-04-08 VITALS
BODY MASS INDEX: 22.05 KG/M2 | SYSTOLIC BLOOD PRESSURE: 121 MMHG | HEART RATE: 97 BPM | TEMPERATURE: 96.4 F | DIASTOLIC BLOOD PRESSURE: 77 MMHG | WEIGHT: 136.6 LBS

## 2022-04-08 DIAGNOSIS — Z85.51 HISTORY OF BLADDER CANCER: ICD-10-CM

## 2022-04-08 DIAGNOSIS — C67.9 MALIGNANT NEOPLASM OF URINARY BLADDER, UNSPECIFIED SITE (HCC): Primary | ICD-10-CM

## 2022-04-08 DIAGNOSIS — C68.8 MALIGNANT NEOPLASM OF OVERLAPPING SITES OF URINARY ORGANS (HCC): ICD-10-CM

## 2022-04-08 DIAGNOSIS — N18.9 CHRONIC KIDNEY DISEASE, UNSPECIFIED CKD STAGE: ICD-10-CM

## 2022-04-08 DIAGNOSIS — Z85.46 HISTORY OF PROSTATE CANCER: ICD-10-CM

## 2022-04-08 DIAGNOSIS — R91.8 LUNG NODULES: ICD-10-CM

## 2022-04-08 LAB
ABSOLUTE EOS #: 0.1 K/UL (ref 0–0.4)
ABSOLUTE LYMPH #: 0.6 K/UL (ref 1–4.8)
ABSOLUTE MONO #: 1.2 K/UL (ref 0.1–1.2)
ALBUMIN SERPL-MCNC: 3.3 G/DL (ref 3.5–5.2)
ALBUMIN/GLOBULIN RATIO: 1 (ref 1–2.5)
ALP BLD-CCNC: 196 U/L (ref 40–129)
ALT SERPL-CCNC: 24 U/L (ref 5–41)
AMYLASE: 110 U/L (ref 28–100)
ANION GAP SERPL CALCULATED.3IONS-SCNC: 12 MMOL/L (ref 9–17)
AST SERPL-CCNC: 21 U/L
BASOPHILS # BLD: 0 % (ref 0–2)
BASOPHILS ABSOLUTE: 0.1 K/UL (ref 0–0.2)
BILIRUB SERPL-MCNC: 0.21 MG/DL (ref 0.3–1.2)
BUN BLDV-MCNC: 60 MG/DL (ref 8–23)
CALCIUM SERPL-MCNC: 10 MG/DL (ref 8.6–10.4)
CHLORIDE BLD-SCNC: 107 MMOL/L (ref 98–107)
CO2: 11 MMOL/L (ref 20–31)
CREAT SERPL-MCNC: 1.86 MG/DL (ref 0.7–1.2)
EOSINOPHILS RELATIVE PERCENT: 1 % (ref 1–4)
GFR AFRICAN AMERICAN: 42 ML/MIN
GFR NON-AFRICAN AMERICAN: 35 ML/MIN
GFR SERPL CREATININE-BSD FRML MDRD: ABNORMAL ML/MIN/{1.73_M2}
GLUCOSE BLD-MCNC: 127 MG/DL (ref 70–99)
HCT VFR BLD CALC: 45.9 % (ref 41–53)
HEMOGLOBIN: 14.9 G/DL (ref 13.5–17.5)
LIPASE: 123 U/L (ref 13–60)
LYMPHOCYTES # BLD: 5 % (ref 24–44)
MCH RBC QN AUTO: 29 PG (ref 26–34)
MCHC RBC AUTO-ENTMCNC: 32.4 G/DL (ref 31–37)
MCV RBC AUTO: 89.7 FL (ref 80–100)
MONOCYTES # BLD: 9 % (ref 2–11)
PDW BLD-RTO: 16.2 % (ref 12.5–15.4)
PLATELET # BLD: 353 K/UL (ref 140–450)
PMV BLD AUTO: 6.9 FL (ref 6–12)
POTASSIUM SERPL-SCNC: 4.5 MMOL/L (ref 3.7–5.3)
PROSTATE SPECIFIC ANTIGEN: <0.02 UG/L
RBC # BLD: 5.12 M/UL (ref 4.5–5.9)
SEG NEUTROPHILS: 85 % (ref 36–66)
SEGMENTED NEUTROPHILS ABSOLUTE COUNT: 10.9 K/UL (ref 1.8–7.7)
SODIUM BLD-SCNC: 130 MMOL/L (ref 135–144)
TOTAL PROTEIN: 6.6 G/DL (ref 6.4–8.3)
TSH SERPL DL<=0.05 MIU/L-ACNC: 2.58 UIU/ML (ref 0.3–5)
WBC # BLD: 12.9 K/UL (ref 3.5–11)

## 2022-04-08 PROCEDURE — 96413 CHEMO IV INFUSION 1 HR: CPT

## 2022-04-08 PROCEDURE — 1036F TOBACCO NON-USER: CPT | Performed by: INTERNAL MEDICINE

## 2022-04-08 PROCEDURE — 36415 COLL VENOUS BLD VENIPUNCTURE: CPT

## 2022-04-08 PROCEDURE — 85025 COMPLETE CBC W/AUTO DIFF WBC: CPT

## 2022-04-08 PROCEDURE — 80053 COMPREHEN METABOLIC PANEL: CPT

## 2022-04-08 PROCEDURE — 82533 TOTAL CORTISOL: CPT

## 2022-04-08 PROCEDURE — 84153 ASSAY OF PSA TOTAL: CPT

## 2022-04-08 PROCEDURE — 84443 ASSAY THYROID STIM HORMONE: CPT

## 2022-04-08 PROCEDURE — G8420 CALC BMI NORM PARAMETERS: HCPCS | Performed by: INTERNAL MEDICINE

## 2022-04-08 PROCEDURE — 83690 ASSAY OF LIPASE: CPT

## 2022-04-08 PROCEDURE — 2580000003 HC RX 258: Performed by: INTERNAL MEDICINE

## 2022-04-08 PROCEDURE — 1123F ACP DISCUSS/DSCN MKR DOCD: CPT | Performed by: INTERNAL MEDICINE

## 2022-04-08 PROCEDURE — G8427 DOCREV CUR MEDS BY ELIG CLIN: HCPCS | Performed by: INTERNAL MEDICINE

## 2022-04-08 PROCEDURE — 99215 OFFICE O/P EST HI 40 MIN: CPT | Performed by: INTERNAL MEDICINE

## 2022-04-08 PROCEDURE — 99211 OFF/OP EST MAY X REQ PHY/QHP: CPT | Performed by: INTERNAL MEDICINE

## 2022-04-08 PROCEDURE — 82150 ASSAY OF AMYLASE: CPT

## 2022-04-08 PROCEDURE — 6360000002 HC RX W HCPCS: Performed by: INTERNAL MEDICINE

## 2022-04-08 PROCEDURE — 4040F PNEUMOC VAC/ADMIN/RCVD: CPT | Performed by: INTERNAL MEDICINE

## 2022-04-08 RX ORDER — DIPHENHYDRAMINE HYDROCHLORIDE 50 MG/ML
50 INJECTION INTRAMUSCULAR; INTRAVENOUS ONCE
Status: CANCELLED | OUTPATIENT
Start: 2022-04-29 | End: 2022-04-29

## 2022-04-08 RX ORDER — EPINEPHRINE 1 MG/ML
0.3 INJECTION, SOLUTION, CONCENTRATE INTRAVENOUS PRN
Status: CANCELLED | OUTPATIENT
Start: 2022-04-29

## 2022-04-08 RX ORDER — METHYLPREDNISOLONE SODIUM SUCCINATE 125 MG/2ML
125 INJECTION, POWDER, LYOPHILIZED, FOR SOLUTION INTRAMUSCULAR; INTRAVENOUS ONCE
Status: CANCELLED | OUTPATIENT
Start: 2022-04-29 | End: 2022-04-29

## 2022-04-08 RX ORDER — SODIUM CHLORIDE 0.9 % (FLUSH) 0.9 %
5-40 SYRINGE (ML) INJECTION PRN
Status: DISCONTINUED | OUTPATIENT
Start: 2022-04-08 | End: 2022-04-09 | Stop reason: HOSPADM

## 2022-04-08 RX ORDER — SODIUM CHLORIDE 9 MG/ML
25 INJECTION, SOLUTION INTRAVENOUS PRN
Status: CANCELLED | OUTPATIENT
Start: 2022-04-29

## 2022-04-08 RX ORDER — MEPERIDINE HYDROCHLORIDE 50 MG/ML
12.5 INJECTION INTRAMUSCULAR; INTRAVENOUS; SUBCUTANEOUS ONCE
Status: CANCELLED | OUTPATIENT
Start: 2022-04-29 | End: 2022-04-29

## 2022-04-08 RX ORDER — SODIUM CHLORIDE 9 MG/ML
INJECTION, SOLUTION INTRAVENOUS CONTINUOUS
Status: CANCELLED | OUTPATIENT
Start: 2022-04-29

## 2022-04-08 RX ORDER — HEPARIN SODIUM (PORCINE) LOCK FLUSH IV SOLN 100 UNIT/ML 100 UNIT/ML
500 SOLUTION INTRAVENOUS PRN
Status: DISCONTINUED | OUTPATIENT
Start: 2022-04-08 | End: 2022-04-09 | Stop reason: HOSPADM

## 2022-04-08 RX ORDER — SODIUM CHLORIDE 0.9 % (FLUSH) 0.9 %
5-40 SYRINGE (ML) INJECTION PRN
Status: CANCELLED | OUTPATIENT
Start: 2022-04-29

## 2022-04-08 RX ORDER — SODIUM CHLORIDE 9 MG/ML
20 INJECTION, SOLUTION INTRAVENOUS ONCE
Status: CANCELLED | OUTPATIENT
Start: 2022-04-29 | End: 2022-04-29

## 2022-04-08 RX ORDER — HEPARIN SODIUM (PORCINE) LOCK FLUSH IV SOLN 100 UNIT/ML 100 UNIT/ML
500 SOLUTION INTRAVENOUS PRN
Status: CANCELLED | OUTPATIENT
Start: 2022-04-29

## 2022-04-08 RX ORDER — SODIUM CHLORIDE 9 MG/ML
20 INJECTION, SOLUTION INTRAVENOUS ONCE
Status: COMPLETED | OUTPATIENT
Start: 2022-04-08 | End: 2022-04-08

## 2022-04-08 RX ADMIN — Medication 500 UNITS: at 15:20

## 2022-04-08 RX ADMIN — SODIUM CHLORIDE, PRESERVATIVE FREE 10 ML: 5 INJECTION INTRAVENOUS at 15:20

## 2022-04-08 RX ADMIN — SODIUM CHLORIDE 200 MG: 9 INJECTION, SOLUTION INTRAVENOUS at 14:40

## 2022-04-08 RX ADMIN — SODIUM CHLORIDE 20 ML/HR: 9 INJECTION, SOLUTION INTRAVENOUS at 14:06

## 2022-04-08 RX ADMIN — SODIUM CHLORIDE, PRESERVATIVE FREE 10 ML: 5 INJECTION INTRAVENOUS at 14:06

## 2022-04-08 NOTE — PROGRESS NOTES
Pt here for C.8D. 1. [facility]   Arrives ambulatory. Denies any new complaints. Labs drawn from port, results reviewed. Pt was seen by Dr. Leslie Marie, order rec'd to proceed with tx. Tx complete without incident. Pt d/c'd in stable condition. Returns 4/29/2022 for office visit with Dr. Leslie Marie and Anneliese Phipps.

## 2022-04-08 NOTE — PROGRESS NOTES
Siva Aguilar                                                                                                                  4/8/2022  MRN:   [id number]  YOB: 1940  PCP:                           GABE Jiménez - CNP  Referring Physician: No ref. provider found  Treating Physician Name: Marco Antonio Schmidt MD      Reason for visit:  Chief Complaint   Patient presents with    Follow-up     review status of disease    Fatigue    Results     go over PET scan    Toxicity check. Discussed treatment plan. Current problems:  History of high-grade urothelial cancer, muscle invasive, pathological stage pT3b pN0 M0-10/2020  Lung metastasis from urothelial cancer, biopsy-proven-10/2021  History of prostate cancer-2010  CKD    Active and recent treatments:  Status post radical prostatectomy-2010 and salvage EBRT-2015  Status post radical robotic cystectomy with PLND and left nephroureterectomy with IC-1/4/2021  Keytruda-cycle 1 day 110/27/21    Summary of Case/History:    Siva Aguilar a 79 y.o.male is a patient with history of hypertension prostate cancer status post prostatectomy in 2010 and salvage radiation therapy several years after was recently diagnosed with muscle invasive bladder cancer. Patient initially presented to outside hospital with complaints of flank pain and constipation. Imaging studies showed left hydronephrosis. Patient underwent cystoscopy on 10/2020 which showed a bladder tumor replacing left ureteral orifice and small tumors on the right and left lateral bladder walls. Patient underwent TURBT on 10/2/2020. Pathology revealed high-grade urothelial cancer muscle invasive. Patient underwent CT PET for staging which was negative for any distant metastasis. Patient underwent radical robotic cystectomy with PLND and left nephroureterectomy in January 2021.   Patient post surgical recovery was slow and patient was in and out of the hospital and rehab due to failure to thrive. More recently patient has moved to [facility] to be closer to family, his niece. Patient is currently residing in assisted living and is now doing better. Patient does admit that he has been having memory loss and it was getting difficult for him to take care of himself at home. Patient recently had CT scans done which showed lung nodules concerning for malignancy. Patient is now establishing care in [facility] and was seen by urology who referred patient to medical oncology. Patient at the time of evaluation denies any bloody urine. He has a urostomy bag. Denies shortness of breath. Denies any pain. Interim History:    Patient presents to the clinic for follow-up visit and to discuss further treatment plan. Patient complains of being fatigued. According to patient's niece he is also more depressed. Patient states that he is not able to go anywhere and always has to stay at his nursing facility. Denies pain. Nausea has improved. Denies hospitalization or ER visit. During this visit patient's allergy, social, medical, surgical history and medications were reviewed and updated.     Past Medical History:   Past Medical History:   Diagnosis Date    Bladder cancer ([facility] [address]     Chronic kidney disease     Hypertension        Past Surgical History:     Past Surgical History:   Procedure Laterality Date    BLADDER REMOVAL      CT NEEDLE BIOPSY LUNG PERCUTANEOUS  10/6/2021    CT NEEDLE BIOPSY LUNG PERCUTANEOUS 10/6/2021 [facility] CT SCAN    IR PORT PLACEMENT EQUAL OR GREATER THAN 5 YEARS  10/25/2021    IR PORT PLACEMENT EQUAL OR GREATER THAN 5 YEARS 10/25/2021 [facility] SPECIAL PROCEDURES    KIDNEY REMOVAL Left     KNEE ARTHROPLASTY      LUNG BIOPSY  10/06/2021    PROSTATE SURGERY      ROTATOR CUFF REPAIR Right        Patient Family Social History:     Social History     Socioeconomic History    Marital status: Single     Spouse name: None    Number of children: None    Years of education: None    Highest education level: None   Occupational History    None   Tobacco Use    Smoking status: Never Smoker    Smokeless tobacco: Never Used   Substance and Sexual Activity    Alcohol use: Not Currently    Drug use: Never    Sexual activity: None   Other Topics Concern    None   Social History Narrative    None     Social Determinants of Health     Financial Resource Strain:     Difficulty of Paying Living Expenses: Not on file   Food Insecurity:     Worried About Running Out of Food in the Last Year: Not on file    Lance of Food in the Last Year: Not on file   Transportation Needs:     Lack of Transportation (Medical): Not on file    Lack of Transportation (Non-Medical):  Not on file   Physical Activity:     Days of Exercise per Week: Not on file    Minutes of Exercise per Session: Not on file   Stress:     Feeling of Stress : Not on file   Social Connections:     Frequency of Communication with Friends and Family: Not on file    Frequency of Social Gatherings with Friends and Family: Not on file    Attends Spiritism Services: Not on file    Active Member of [address] [place] [address] or Organizations: Not on file    Attends Club or Organization Meetings: Not on file    Marital Status: Not on file   Intimate Partner Violence:     Fear of Current or Ex-Partner: Not on file    Emotionally Abused: Not on file    Physically Abused: Not on file    Sexually Abused: Not on file   Housing Stability:     Unable to Pay for Housing in the Last Year: Not on file    Number of [place] in the Last Year: Not on file    Unstable Housing in the Last Year: Not on file     Family History   Problem Relation Age of Onset    Heart Disease Mother     Stroke Father     Breast Cancer Father     Other Sister         ALS    Heart Disease Brother      Current Medications:     Current Outpatient Medications   Medication Sig Dispense Refill    ondansetron (ZOFRAN) 4 MG tablet Take 4 mg by mouth every 8 hours as needed for Nausea or Vomiting      acetaminophen (TYLENOL) 325 MG tablet Take 650 mg by mouth every 6 hours as needed for Pain       No current facility-administered medications for this visit. Allergies:   Codeine    Review of Systems:    Constitutional: No fever or chills. No night sweats. +weight stabilized. Positive fatigue  Eyes: No eye discharge, double vision, or eye pain   HEENT: negative for sore mouth, sore throat, hoarseness and voice change  +hearing loss  Respiratory: negative for cough , sputum, dyspnea, wheezing, hemoptysis, chest pain   Cardiovascular: negative for chest pain, dyspnea, palpitations, orthopnea, PND   Gastrointestinal: negative for diarrhea, constipation, abdominal pain, Dysphagia, hematemesis and hematochezia +nausea and vomiting - resolved   Genitourinary: negative for frequency, dysuria, nocturia, urinary incontinence, and hematuria   Integument: negative for rash, skin lesions, bruises. Positive for itching  Hematologic/Lymphatic: negative for easy bruising, bleeding, lymphadenopathy, or petechiae   Endocrine: negative for heat or cold intolerance,weight changes, change in bowel habits and hair loss   Musculoskeletal: negative for myalgias, arthralgias, pain, joint swelling,and bone pain   Neurological: negative for headaches, dizziness, seizures, weakness, numbness.   Positive memory loss        Physical Exam:    Vitals: /77   Pulse 97   Temp 96.4 °F (35.8 °C) (Temporal)   Wt 136 lb 9.6 oz (62 kg)   BMI 22.05 kg/m²   General appearance - well appearing, no in pain or distress  Mental status - AAO X3  Eyes - pupils equal and reactive, extraocular eye movements intact  Mouth - mucous membranes moist, pharynx normal without lesions  Neck - supple, no significant adenopathy  Lymphatics - no palpable lymphadenopathy, no hepatosplenomegaly  Chest - clear to auscultation, no wheezes, rales or rhonchi, symmetric air entry  Heart - normal rate, regular rhythm, normal S1, S2, no murmurs  Abdomen - soft, nontender, nondistended, no masses or organomegaly. urostomy bag in place  Neurological - alert, oriented, normal speech, no focal findings or movement disorder noted  Extremities - peripheral pulses normal, no pedal edema, no clubbing or cyanosis  Skin - normal coloration and turgor, no rashes, no suspicious skin lesions noted       DATA:    Lab Results   Component Value Date    WBC 12.9 (H) 04/08/2022    HGB 14.9 04/08/2022    HCT 45.9 04/08/2022    MCV 89.7 04/08/2022     04/08/2022       Chemistry        Component Value Date/Time     03/16/2022 0906    K 4.3 03/16/2022 0906     (H) 03/16/2022 0906    CO2 12 (L) 03/16/2022 0906    BUN 50 (H) 03/16/2022 0906    CREATININE 1.84 (H) 03/16/2022 0906        Component Value Date/Time    CALCIUM 10.2 03/16/2022 0906    ALKPHOS 191 (H) 03/16/2022 0906    AST 19 03/16/2022 0906    ALT 23 03/16/2022 0906    BILITOT 0.28 (L) 03/16/2022 0906            Impression:  History of high-grade urothelial cancer, muscle invasive, pathological stage pT3b pN0 M0-10/2010. Status post radical robotic cystectomy with PLND and left nephroureterectomy with IC-1/4/2021  Pulmonary metastasis, biopsy-proven-10/2021  Keytruda-cycle 1 day 110/27/21  History of prostate cancer-2010. Status post radical prostatectomy-2010 and salvage EBRT-2015  CKD     Plan:  Personally reviewed results of lab work-up and the relevant clinical data. Toxicity check performed. Patient is complaining of fatigue but it is manageable. I offered to start patient on an antidepressant but patient at this point is not interested. We will continue to readdress  Reiterated treatment plan.   Continue treatment until disease progression or intolerable toxicity  Patient continues to follow-up with urology  He has also establish care with nephrology team  We will obtain CT PET to assess disease status  Reiterated logistics prognostic data and potential side effects of ongoing treatment. Continue surveillance for prostate cancer. We will treat today as per orders. Patient has solitary kidney. Closely monitor kidney function. Patient's conditions were taken into consideration when deciding risk-benefit for therapy. Patient is at high risk for drug interaction risk of complications. Given multiple comorbid conditions patient is at high risk for complication from intervention, risk of hospitalization, medical interaction overall life expectancy. NCCN guidelines were reviewed and discussed with the patient. The diagnosis and care plan were discussed with the patient in detail. I discussed the natural history of the disease, prognosis, risks and goals of therapy and answered all the patients questions to the best of my ability. Patient expressed understanding and was in agreement. Scribe Attestation   This note was created by Connie Joel acting as scribe for the physician signing this note  Electronically Signed  Connie Joel, 4/8/2022  Scribe, [employer]. Attending Attestation   Note was reviewed and edited. I am in agreement with the note as entered      Jose Francisco Loco MD            I spent more than 40 minutes examining, evaluating, reviewing data, counseling the patient and coordinating care. Greater than 50% of time was spent face-to-face with the patient this note is created with the assistance of a speech recognition program.  While intending to generate a document that actually reflects the content of the visit, the document can still have some errors including those of syntax and sound a like substitutions which may escape proof reading. It such instances, actual meaning can be extrapolated by contextual diversion.

## 2022-04-08 NOTE — TELEPHONE ENCOUNTER
AVS from 4/8/22     tx today after labs   rv in 3 weeks with tx   Ct pet prior ot rv       *tx as sched   *Pet sched for [email or web address]  *rv is sched for [email or web address] w/tx to follow    PT was given AVS and an appt schedule

## 2022-04-09 LAB
CORTISOL COLLECTION INFO: NORMAL
CORTISOL: 13.3 UG/DL (ref 2.7–18.4)

## 2022-04-20 ENCOUNTER — HOSPITAL ENCOUNTER (INPATIENT)
Age: 82
LOS: 2 days | Discharge: NURSING, REHAB OR LONG-TERM CARE | DRG: 690 | End: 2022-04-22
Attending: EMERGENCY MEDICINE | Admitting: STUDENT IN AN ORGANIZED HEALTH CARE EDUCATION/TRAINING PROGRAM
Payer: MEDICARE

## 2022-04-20 DIAGNOSIS — N39.0 URINARY TRACT INFECTION WITHOUT HEMATURIA, SITE UNSPECIFIED: Primary | ICD-10-CM

## 2022-04-20 DIAGNOSIS — N28.9 RENAL INSUFFICIENCY: ICD-10-CM

## 2022-04-20 DIAGNOSIS — E86.0 DEHYDRATION: ICD-10-CM

## 2022-04-20 PROBLEM — N17.9 ACUTE KIDNEY INJURY SUPERIMPOSED ON CKD (HCC): Status: ACTIVE | Noted: 2022-04-20

## 2022-04-20 PROBLEM — N18.9 ACUTE KIDNEY INJURY SUPERIMPOSED ON CKD (HCC): Status: ACTIVE | Noted: 2022-04-20

## 2022-04-20 PROBLEM — C78.00 MALIGNANT NEOPLASM METASTATIC TO LUNG (HCC): Status: ACTIVE | Noted: 2022-04-20

## 2022-04-20 LAB
-: ABNORMAL
ABSOLUTE EOS #: 0 K/UL (ref 0–0.4)
ABSOLUTE LYMPH #: 0.38 K/UL (ref 1–4.8)
ABSOLUTE MONO #: 1.02 K/UL (ref 0.1–0.8)
AMORPHOUS: ABNORMAL
ANION GAP SERPL CALCULATED.3IONS-SCNC: 11 MMOL/L (ref 9–17)
BACTERIA: ABNORMAL
BASOPHILS # BLD: 0 % (ref 0–2)
BASOPHILS ABSOLUTE: 0 K/UL (ref 0–0.2)
BILIRUBIN URINE: NEGATIVE
BUN BLDV-MCNC: 74 MG/DL (ref 8–23)
CALCIUM SERPL-MCNC: 10.6 MG/DL (ref 8.6–10.4)
CASTS UA: ABNORMAL /LPF
CASTS UA: ABNORMAL /LPF
CHLORIDE BLD-SCNC: 111 MMOL/L (ref 98–107)
CO2: 12 MMOL/L (ref 20–31)
COLOR: YELLOW
CREAT SERPL-MCNC: 2.05 MG/DL (ref 0.7–1.2)
EOSINOPHILS RELATIVE PERCENT: 0 % (ref 1–4)
EPITHELIAL CELLS UA: ABNORMAL /HPF (ref 0–5)
GFR AFRICAN AMERICAN: 38 ML/MIN
GFR NON-AFRICAN AMERICAN: 31 ML/MIN
GFR SERPL CREATININE-BSD FRML MDRD: ABNORMAL ML/MIN/{1.73_M2}
GLUCOSE BLD-MCNC: 120 MG/DL (ref 70–99)
GLUCOSE URINE: NEGATIVE
HCT VFR BLD CALC: 48.3 % (ref 41–53)
HEMOGLOBIN: 15.5 G/DL (ref 13.5–17.5)
KETONES, URINE: NEGATIVE
LEUKOCYTE ESTERASE, URINE: ABNORMAL
LYMPHOCYTES # BLD: 3 % (ref 24–44)
MCH RBC QN AUTO: 28.3 PG (ref 26–34)
MCHC RBC AUTO-ENTMCNC: 32 G/DL (ref 31–37)
MCV RBC AUTO: 88.3 FL (ref 80–100)
MONOCYTES # BLD: 8 % (ref 1–7)
MORPHOLOGY: NORMAL
MUCUS: ABNORMAL
NITRITE, URINE: NEGATIVE
OTHER OBSERVATIONS UA: ABNORMAL
PDW BLD-RTO: 17.1 % (ref 12.5–15.4)
PH UA: 6.5 (ref 5–8)
PLATELET # BLD: 330 K/UL (ref 140–450)
PMV BLD AUTO: 6.9 FL (ref 6–12)
POTASSIUM SERPL-SCNC: 3.9 MMOL/L (ref 3.7–5.3)
PROTEIN UA: ABNORMAL
RBC # BLD: 5.47 M/UL (ref 4.5–5.9)
RBC UA: ABNORMAL /HPF (ref 0–2)
SEG NEUTROPHILS: 89 % (ref 36–66)
SEGMENTED NEUTROPHILS ABSOLUTE COUNT: 11.4 K/UL (ref 1.8–7.7)
SODIUM BLD-SCNC: 134 MMOL/L (ref 135–144)
SPECIFIC GRAVITY UA: 1.01 (ref 1–1.03)
TROPONIN, HIGH SENSITIVITY: 27 NG/L (ref 0–22)
TROPONIN, HIGH SENSITIVITY: 34 NG/L (ref 0–22)
TURBIDITY: ABNORMAL
URINE HGB: ABNORMAL
UROBILINOGEN, URINE: NORMAL
WBC # BLD: 12.8 K/UL (ref 3.5–11)
WBC UA: ABNORMAL /HPF (ref 0–5)

## 2022-04-20 PROCEDURE — 96374 THER/PROPH/DIAG INJ IV PUSH: CPT

## 2022-04-20 PROCEDURE — 36415 COLL VENOUS BLD VENIPUNCTURE: CPT

## 2022-04-20 PROCEDURE — 81001 URINALYSIS AUTO W/SCOPE: CPT

## 2022-04-20 PROCEDURE — 93005 ELECTROCARDIOGRAM TRACING: CPT | Performed by: EMERGENCY MEDICINE

## 2022-04-20 PROCEDURE — 85025 COMPLETE CBC W/AUTO DIFF WBC: CPT

## 2022-04-20 PROCEDURE — 84484 ASSAY OF TROPONIN QUANT: CPT

## 2022-04-20 PROCEDURE — 80048 BASIC METABOLIC PNL TOTAL CA: CPT

## 2022-04-20 PROCEDURE — 99222 1ST HOSP IP/OBS MODERATE 55: CPT | Performed by: STUDENT IN AN ORGANIZED HEALTH CARE EDUCATION/TRAINING PROGRAM

## 2022-04-20 PROCEDURE — 87040 BLOOD CULTURE FOR BACTERIA: CPT

## 2022-04-20 PROCEDURE — 6360000002 HC RX W HCPCS: Performed by: EMERGENCY MEDICINE

## 2022-04-20 PROCEDURE — 87077 CULTURE AEROBIC IDENTIFY: CPT

## 2022-04-20 PROCEDURE — 87086 URINE CULTURE/COLONY COUNT: CPT

## 2022-04-20 PROCEDURE — 6360000002 HC RX W HCPCS: Performed by: STUDENT IN AN ORGANIZED HEALTH CARE EDUCATION/TRAINING PROGRAM

## 2022-04-20 PROCEDURE — 2580000003 HC RX 258: Performed by: STUDENT IN AN ORGANIZED HEALTH CARE EDUCATION/TRAINING PROGRAM

## 2022-04-20 PROCEDURE — 2580000003 HC RX 258: Performed by: EMERGENCY MEDICINE

## 2022-04-20 PROCEDURE — 87186 SC STD MICRODIL/AGAR DIL: CPT

## 2022-04-20 PROCEDURE — 1210000000 HC MED SURG R&B

## 2022-04-20 PROCEDURE — 96361 HYDRATE IV INFUSION ADD-ON: CPT

## 2022-04-20 PROCEDURE — 99285 EMERGENCY DEPT VISIT HI MDM: CPT

## 2022-04-20 RX ORDER — HEPARIN SODIUM 5000 [USP'U]/ML
5000 INJECTION, SOLUTION INTRAVENOUS; SUBCUTANEOUS 3 TIMES DAILY
Status: DISCONTINUED | OUTPATIENT
Start: 2022-04-20 | End: 2022-04-22 | Stop reason: HOSPADM

## 2022-04-20 RX ORDER — ONDANSETRON 2 MG/ML
4 INJECTION INTRAMUSCULAR; INTRAVENOUS EVERY 6 HOURS PRN
Status: DISCONTINUED | OUTPATIENT
Start: 2022-04-20 | End: 2022-04-22 | Stop reason: HOSPADM

## 2022-04-20 RX ORDER — ACETAMINOPHEN 325 MG/1
650 TABLET ORAL EVERY 6 HOURS PRN
Status: DISCONTINUED | OUTPATIENT
Start: 2022-04-20 | End: 2022-04-22 | Stop reason: HOSPADM

## 2022-04-20 RX ORDER — SODIUM CHLORIDE 9 MG/ML
INJECTION, SOLUTION INTRAVENOUS CONTINUOUS
Status: DISCONTINUED | OUTPATIENT
Start: 2022-04-20 | End: 2022-04-22 | Stop reason: HOSPADM

## 2022-04-20 RX ORDER — POLYETHYLENE GLYCOL 3350 17 G/17G
17 POWDER, FOR SOLUTION ORAL DAILY PRN
Status: DISCONTINUED | OUTPATIENT
Start: 2022-04-20 | End: 2022-04-22 | Stop reason: HOSPADM

## 2022-04-20 RX ORDER — SODIUM CHLORIDE 9 MG/ML
INJECTION, SOLUTION INTRAVENOUS PRN
Status: DISCONTINUED | OUTPATIENT
Start: 2022-04-20 | End: 2022-04-22 | Stop reason: HOSPADM

## 2022-04-20 RX ORDER — ONDANSETRON 4 MG/1
4 TABLET, ORALLY DISINTEGRATING ORAL EVERY 8 HOURS PRN
Status: DISCONTINUED | OUTPATIENT
Start: 2022-04-20 | End: 2022-04-22 | Stop reason: HOSPADM

## 2022-04-20 RX ORDER — 0.9 % SODIUM CHLORIDE 0.9 %
500 INTRAVENOUS SOLUTION INTRAVENOUS ONCE
Status: COMPLETED | OUTPATIENT
Start: 2022-04-20 | End: 2022-04-20

## 2022-04-20 RX ORDER — ACETAMINOPHEN 650 MG/1
650 SUPPOSITORY RECTAL EVERY 6 HOURS PRN
Status: DISCONTINUED | OUTPATIENT
Start: 2022-04-20 | End: 2022-04-22 | Stop reason: HOSPADM

## 2022-04-20 RX ORDER — SODIUM CHLORIDE 0.9 % (FLUSH) 0.9 %
5-40 SYRINGE (ML) INJECTION EVERY 12 HOURS SCHEDULED
Status: DISCONTINUED | OUTPATIENT
Start: 2022-04-20 | End: 2022-04-22 | Stop reason: HOSPADM

## 2022-04-20 RX ORDER — SODIUM CHLORIDE 0.9 % (FLUSH) 0.9 %
5-40 SYRINGE (ML) INJECTION PRN
Status: DISCONTINUED | OUTPATIENT
Start: 2022-04-20 | End: 2022-04-22 | Stop reason: HOSPADM

## 2022-04-20 RX ADMIN — SODIUM CHLORIDE, PRESERVATIVE FREE 10 ML: 5 INJECTION INTRAVENOUS at 21:02

## 2022-04-20 RX ADMIN — HEPARIN SODIUM 5000 UNITS: 5000 INJECTION INTRAVENOUS; SUBCUTANEOUS at 21:02

## 2022-04-20 RX ADMIN — CEFTRIAXONE SODIUM 1000 MG: 1 INJECTION, POWDER, FOR SOLUTION INTRAMUSCULAR; INTRAVENOUS at 16:08

## 2022-04-20 RX ADMIN — SODIUM CHLORIDE 500 ML: 9 INJECTION, SOLUTION INTRAVENOUS at 15:20

## 2022-04-20 RX ADMIN — SODIUM CHLORIDE: 9 INJECTION, SOLUTION INTRAVENOUS at 18:53

## 2022-04-20 ASSESSMENT — PAIN - FUNCTIONAL ASSESSMENT: PAIN_FUNCTIONAL_ASSESSMENT: NONE - DENIES PAIN

## 2022-04-20 NOTE — PLAN OF CARE
Problem: Skin/Tissue Integrity  Goal: Absence of new skin breakdown  Description: 1. Monitor for areas of redness and/or skin breakdown  2. Assess vascular access sites hourly  3. Every 4-6 hours minimum:  Change oxygen saturation probe site  4. Every 4-6 hours:  If on nasal continuous positive airway pressure, respiratory therapy assess nares and determine need for appliance change or resting period.   4/20/2022 1904 by Paolo Dash RN  Outcome: Progressing     Problem: Safety - Adult  Goal: Free from fall injury  4/20/2022 1904 by Paolo Dash RN  Outcome: Progressing

## 2022-04-20 NOTE — PROGRESS NOTES
Admitted from ER to room 342. Pt alert and oriented. Pt oriented to call light system and agreeable to call for assistance. Family/ niece Anahi who is POA at bed side. Admission documentation and initial assessment completed; VS obtained; plan of care reviewed with pt and family; all questions answered.  Pt currently resting comfortably; continue to monitor

## 2022-04-20 NOTE — ED PROVIDER NOTES
[id number] [facility] ED  [address] [facility] [address]  Phone: [phone]  Fax: Britany Mcguire [phone]      Pt Name: Dolphus Bence  MRN: [id number]  [place] 1940  Date of evaluation: 4/20/2022    CHIEF COMPLAINT       Chief Complaint   Patient presents with    Urinary Tract Infection       HISTORY OF PRESENT ILLNESS    Dolphus Bence is a 79 y.o. male who presents to the emergency part with possible UTI. Patient was seen on Friday at [place] [address] for UTI symptoms nausea and vomiting. Treated with IV fluids and antibiotics and sent back to his ECF. Was placed on oral antibiotics. Since then patient's family states that he has not really bounced back like he typically does. He does have a urostomy secondary to bladder cancer and bladder removal.  He sees oncology here in [facility]. Denies any fevers or chills. REVIEW OF SYSTEMS       Constitutional: No fevers or chills   HEENT: No sore throat, rhinorrhea, or earache   Eyes: No blurry vision or double vision no drainage   Cardiovascular: No chest pain or tachycardia   Respiratory: No wheezing or shortness of breath no cough   Gastrointestinal: Positive nausea vomiting resolved no diarrhea, constipation, or abdominal pain   : No hematuria or dysuria   Musculoskeletal: No swelling or pain   Skin: No rash   Neurological: No focal neurologic complaints, paresthesias, weakness, or headache     PAST MEDICAL HISTORY    has a past medical history of Bladder cancer ([address] Chronic kidney disease, and Hypertension. SURGICAL HISTORY      has a past surgical history that includes Bladder removal; Kidney removal (Left); Prostate surgery; Knee Arthroplasty; Rotator cuff repair (Right); Lung biopsy (10/06/2021); CT NEEDLE BIOPSY LUNG PERCUTANEOUS (10/6/2021); and IR PORT PLACEMENT > 5 YEARS (10/25/2021).     CURRENT MEDICATIONS       Previous Medications    ACETAMINOPHEN (TYLENOL) 325 MG TABLET    Take 650 mg by mouth every 6 hours as needed for Pain    ONDANSETRON (ZOFRAN) 4 MG TABLET    Take 4 mg by mouth every 8 hours as needed for Nausea or Vomiting       ALLERGIES     is allergic to codeine. FAMILY HISTORY     He indicated that his mother is . He indicated that his father is . He indicated that the status of his sister is unknown. He indicated that the status of his brother is unknown.     family history includes Breast Cancer in his father; Heart Disease in his brother and mother; Other in his sister; Stroke in his father. SOCIAL HISTORY      reports that he has never smoked. He has never used smokeless tobacco. He reports previous alcohol use. He reports that he does not use drugs. PHYSICAL EXAM       ED Triage Vitals [22 1418]   BP Temp Temp src Pulse Resp SpO2 Height Weight   123/67 -- -- 83 16 98 % 5' 6\" (1.676 m) 135 lb (61.2 kg)       Constitutional: Alert,  nontoxic, following commands  HEENT: Extraocular muscles intact, mucus membranes dry no posterior pharyngeal erythema or exudates, Pupils equal, round, reactive to light,   Neck: Trachea midline   Cardiovascular: Regular rhythm and rate no murmurs   Respiratory: Clear to auscultation bilaterally no wheezes, rhonchi, rales, no respiratory distress no tachypnea no retractions no hypoxia  Gastrointestinal: Soft, nontender, nondistended, positive bowel sounds. No rebound, rigidity, or guarding. No abdominal bruit no pulsatile mass positive urostomy with clear urine  Musculoskeletal: No extremity pain or swelling no calf tenderness or asymmetry  Neurologic: Moving all 4 extremities without difficulty there are no gross focal neurologic deficits   Skin: Warm and dry       DIFFERENTIAL DIAGNOSIS/ MDM:     IV fluids and labs. Urinalysis.     DIAGNOSTIC RESULTS     EKG: All EKG's are interpreted by the Emergency Department Physician who either signs or Co-signs this chart in the absence of a cardiologist.    1506 sinus rhythm rate 88  QRS 90  no ST elevations.   T wave inversions V4 through V6 no comparison available    Not indicated unless otherwise documented above    LABS:  Results for orders placed or performed during the hospital encounter of 04/20/22   CBC with Auto Differential   Result Value Ref Range    WBC 12.8 (H) 3.5 - 11.0 k/uL    RBC 5.47 4.5 - 5.9 m/uL    Hemoglobin 15.5 13.5 - 17.5 g/dL    Hematocrit 48.3 41 - 53 %    MCV 88.3 80 - 100 fL    MCH 28.3 26 - 34 pg    MCHC 32.0 31 - 37 g/dL    RDW 17.1 (H) 12.5 - 15.4 %    Platelets 694 138 - 842 k/uL    MPV 6.9 6.0 - 12.0 fL    Seg Neutrophils 89 (H) 36 - 66 %    Lymphocytes 3 (L) 24 - 44 %    Monocytes 8 (H) 1 - 7 %    Eosinophils % 0 (L) 1 - 4 %    Basophils 0 0 - 2 %    Segs Absolute 11.40 (H) 1.8 - 7.7 k/uL    Absolute Lymph # 0.38 (L) 1.0 - 4.8 k/uL    Absolute Mono # 1.02 (H) 0.1 - 0.8 k/uL    Absolute Eos # 0.00 0.0 - 0.4 k/uL    Basophils Absolute 0.00 0.0 - 0.2 k/uL    Morphology Normal    Basic Metabolic Panel   Result Value Ref Range    Glucose 120 (H) 70 - 99 mg/dL    BUN 74 (H) 8 - 23 mg/dL    CREATININE 2.05 (H) 0.70 - 1.20 mg/dL    Calcium 10.6 (H) 8.6 - 10.4 mg/dL    Sodium 134 (L) 135 - 144 mmol/L    Potassium 3.9 3.7 - 5.3 mmol/L    Chloride 111 (H) 98 - 107 mmol/L    CO2 12 (L) 20 - 31 mmol/L    Anion Gap 11 9 - 17 mmol/L    GFR Non-African American 31 (L) >60 mL/min    GFR  38 (L) >60 mL/min    GFR Comment         Troponin   Result Value Ref Range    Troponin, High Sensitivity 34 (H) 0 - 22 ng/L   Urinalysis with Reflex to Culture    Specimen: Urine, clean catch   Result Value Ref Range    Color, UA Yellow Yellow    Turbidity UA SLIGHTLY CLOUDY (A) Clear    Glucose, Ur NEGATIVE NEGATIVE    Bilirubin Urine NEGATIVE NEGATIVE    Ketones, Urine NEGATIVE NEGATIVE    Specific Gravity, UA 1.010 1.005 - 1.030    Urine Hgb SMALL (A) NEGATIVE    pH, UA 6.5 5.0 - 8.0    Protein, UA 1+ (A) NEGATIVE    Urobilinogen, Urine Normal Normal    Nitrite, Urine NEGATIVE NEGATIVE    Leukocyte Esterase, Urine MODERATE (A) NEGATIVE   Microscopic Urinalysis   Result Value Ref Range    -          WBC, UA 20 TO 50 0 - 5 /HPF    RBC, UA 2 TO 5 0 - 2 /HPF    Casts UA 0 TO 2 /LPF    Casts UA COARSELY GRANULAR /LPF    Epithelial Cells UA 5 TO 10 0 - 5 /HPF    Bacteria, UA MANY (A) None    Mucus, UA 1+ (A) None    Amorphous, UA 1+ (A) None    Other Observations UA Culture ordered based on defined criteria. (A) NOT REQ. Not indicated unless otherwise documented above    RADIOLOGY:   I reviewed the radiologist interpretations:    No orders to display       Not indicated unless otherwise documented above    EMERGENCY DEPARTMENT COURSE:     The patient was given the following medications:  Orders Placed This Encounter   Medications    0.9 % sodium chloride bolus    cefTRIAXone (ROCEPHIN) 1000 mg IVPB in 50 mL D5W minibag     Order Specific Question:   Antimicrobial Indications     Answer:   Urinary Tract Infection        Vitals:   -------------------------  /67   Pulse 83   Resp 16   Ht 5' 6\" (1.676 m)   Wt 61.2 kg (135 lb)   SpO2 98%   BMI 21.79 kg/m²     4:00 PM positive UTI will treat with IV Rocephin White blood cell count slightly elevated. BUN and creatinine slowly creeping up over the past several months. Patient with fatigue. Will admit and discussed with hospitalist    4:35 PM discussed with Dr. Mat Lane agrees to admission. CRITICAL CARE:    None    CONSULTS:    None    PROCEDURES:    None      OARRS Report if indicated             FINAL IMPRESSION      1. Urinary tract infection without hematuria, site unspecified    2. Dehydration    3. Renal insufficiency          DISPOSITION/PLAN   DISPOSITION Decision To Admit 04/20/2022 04:28:49 PM        CONDITION ON DISPOSITION: STABLE       PATIENT REFERRED TO:  No follow-up provider specified.     DISCHARGE MEDICATIONS:  New Prescriptions    No medications on file       (Please note that portions of this note were completed with a voice recognition program.  Efforts were made to edit the dictations but occasionally words are mis-transcribed.)    Aaron Back DO   Attending Emergency Physician     Aaron Back DO  04/20/22 8606

## 2022-04-20 NOTE — H&P
[facility]  Office: [address], DO, Bijan Howard, DO, Ridge Hernandez, DO, Vianey Red Blood, DO, Martell Bhagat MD, Deepa Hernandez MD, Arabella Hemphill MD, Мария Qrueshi MD, Ramy Balderas MD, Pavithra Thorpe MD, Leona Stein MD, Marielena Kaba, DO, Nghia Alex, DO, Sarita Up MD,  Isai Juarez, DO, Hayley Barraza MD, Ashley Villasenor MD, Amador Lopez MD, Codey South DO, Daiana Gleason MD, Jn Pickard MD, Robinsonelvin Lr [facility], [facility], CNP, Mehdi Dominguez, CNP, Africa Gotti, CNP, Juan Fajardo, CNP, Bernabe Bravo, CNP, Nirmal Esquivel PA-C, Holli Oconnor, DNP, Di Li, [facility], Alejandro Bradford, CNP, Ramy Hutchins, CNP, Erma Lambert, CNS, Diamante Cali, [facility], Aniceto Foster, CNP, Stephania Fuentes, CNP, Arlette Miller, [address]    HISTORY AND PHYSICAL EXAMINATION            Date:   4/20/2022  Patient name:  Maggy Rasmussen  Date of admission:  4/20/2022  2:11 PM  MRN:   [id number]  Account:  [de-identified]  YOB: 1940  PCP:    GABE Navarro CNP  Room:   ER08/ER08  Code Status:    Prior    Chief Complaint:     Chief Complaint   Patient presents with    Urinary Tract Infection     History Obtained From:     Patient; POA (Anahi)     History of Present Illness:     Magyg Rasmussen is a 79 y.o. male with a past medical history of bladder cancer with metastatic disease to the lungs (currently on Keytruda), cystectomy, left nephroureterectomy and chronic kidney disease who presented to the emergency department on 4/20/2022 complaining of malaise, fatigue and anorexia. The patient's POA (Anahi) states that his symptoms began last week and have progressively worsened. The patient was seen in the emergency department at [place] [address] on Friday and was discharged following a dose of ceftriaxone and a fluid bolus.  The patient states that he has not \"bounced back\" since then and continues to experience profound fatigue and malaise. In the ED, the patient was afebrile and nontoxic appearing. Urinalysis was suggestive of UTI with many bacteria and moderate leukocyte esterase. The patient's POA expressed concerns regarding the patient's ability to care for himself in his assisted living community and states that she would like him to be placed in a SNF if possible. The patient is admitted to internal medicine for further management of dehydration and urinary tract infection. Discussed goals of care with the patient and his power of . The patient has signed [facility] paperwork at his facility and would like to remain a DNR-CC on admission. Past Medical History:     Past Medical History:   Diagnosis Date    Bladder cancer ([facility] [address]     Chronic kidney disease     Hypertension         Past Surgical History:     Past Surgical History:   Procedure Laterality Date    BLADDER REMOVAL      CT NEEDLE BIOPSY LUNG PERCUTANEOUS  10/6/2021    CT NEEDLE BIOPSY LUNG PERCUTANEOUS 10/6/2021 [facility] CT SCAN    IR PORT PLACEMENT EQUAL OR GREATER THAN 5 YEARS  10/25/2021    IR PORT PLACEMENT EQUAL OR GREATER THAN 5 YEARS 10/25/2021 [facility]    KIDNEY REMOVAL Left     KNEE ARTHROPLASTY      LUNG BIOPSY  10/06/2021    PROSTATE SURGERY      ROTATOR CUFF REPAIR Right         Medications Prior to Admission:     Prior to Admission medications    Medication Sig Start Date End Date Taking? Authorizing Provider   ondansetron (ZOFRAN) 4 MG tablet Take 4 mg by mouth every 8 hours as needed for Nausea or Vomiting    Historical Provider, MD   acetaminophen (TYLENOL) 325 MG tablet Take 650 mg by mouth every 6 hours as needed for Pain    Historical Provider, MD        Allergies:     Codeine    Social History:     Tobacco:    reports that he has never smoked. He has never used smokeless tobacco.  Alcohol:      reports previous alcohol use. Drug Use:  reports no history of drug use.     Family History:     Family History Problem Relation Age of Onset    Heart Disease Mother     Stroke Father     Breast Cancer Father     Other Sister         ALS    Heart Disease Brother        Review of Systems:     Positive and Negative as described in HPI. CONSTITUTIONAL:  Positive for fatigue, malaise and anorexia. HEENT:  negative for vision, hearing changes, runny nose, throat pain  RESPIRATORY:  negative for shortness of breath, cough, congestion, wheezing  CARDIOVASCULAR:  negative for chest pain, palpitations  GASTROINTESTINAL:  negative for nausea, vomiting, diarrhea, constipation, change in bowel habits, abdominal pain   GENITOURINARY:  negative for difficulty of urination, burning with urination, frequency   INTEGUMENT:  negative for rash, skin lesions, easy bruising   HEMATOLOGIC/LYMPHATIC:  negative for swelling/edema   ALLERGIC/IMMUNOLOGIC:  negative for urticaria , itching  ENDOCRINE:  negative increase in drinking, increase in urination, hot or cold intolerance  MUSCULOSKELETAL:  negative joint pains, muscle aches, swelling of joints  NEUROLOGICAL:  negative for headaches, dizziness, lightheadedness, numbness, pain, tingling extremities  BEHAVIOR/PSYCH:  negative for depression, anxiety    Physical Exam:   /67   Pulse 83   Resp 16   Ht 5' 6\" (1.676 m)   Wt 135 lb (61.2 kg)   SpO2 98%   BMI 21.79 kg/m²   No data recorded. No results for input(s): POCGLU in the last 72 hours. No intake or output data in the 24 hours ending 04/20/22 1717    General Appearance:  Frail and chronically ill-appearing.    Mental status: oriented to person, place, and time  Head:  normocephalic, atraumatic  Eye: no icterus, redness, pupils equal and reactive, extraocular eye movements intact, conjunctiva clear  Ear: normal external ear, no discharge, hearing intact  Nose:  no drainage noted  Mouth: mucous membranes dry   Neck: supple, no carotid bruits, thyroid not palpable  Lungs: Bilateral equal air entry, clear to ausculation, no wheezing, rales or rhonchi, normal effort. Right-sided chest wall port appreciated. Cardiovascular: normal rate, regular rhythm, no murmur, gallop, rub  Abdomen: Soft, non-tender and non-distended. Urostomy bag appreciated.    Neurologic: There are no new focal motor or sensory deficits, normal muscle tone and bulk, no abnormal sensation, normal speech, cranial nerves II through XII grossly intact  Skin: No gross lesions, rashes, bruising or bleeding on exposed skin area  Extremities:  peripheral pulses palpable, no pedal edema or calf pain with palpation  Psych: normal affect     Investigations:      Laboratory Testing:  Recent Results (from the past 24 hour(s))   CBC with Auto Differential    Collection Time: 04/20/22  3:02 PM   Result Value Ref Range    WBC 12.8 (H) 3.5 - 11.0 k/uL    RBC 5.47 4.5 - 5.9 m/uL    Hemoglobin 15.5 13.5 - 17.5 g/dL    Hematocrit 48.3 41 - 53 %    MCV 88.3 80 - 100 fL    MCH 28.3 26 - 34 pg    MCHC 32.0 31 - 37 g/dL    RDW 17.1 (H) 12.5 - 15.4 %    Platelets 174 751 - 863 k/uL    MPV 6.9 6.0 - 12.0 fL    Seg Neutrophils 89 (H) 36 - 66 %    Lymphocytes 3 (L) 24 - 44 %    Monocytes 8 (H) 1 - 7 %    Eosinophils % 0 (L) 1 - 4 %    Basophils 0 0 - 2 %    Segs Absolute 11.40 (H) 1.8 - 7.7 k/uL    Absolute Lymph # 0.38 (L) 1.0 - 4.8 k/uL    Absolute Mono # 1.02 (H) 0.1 - 0.8 k/uL    Absolute Eos # 0.00 0.0 - 0.4 k/uL    Basophils Absolute 0.00 0.0 - 0.2 k/uL    Morphology Normal    Basic Metabolic Panel    Collection Time: 04/20/22  3:02 PM   Result Value Ref Range    Glucose 120 (H) 70 - 99 mg/dL    BUN 74 (H) 8 - 23 mg/dL    CREATININE 2.05 (H) 0.70 - 1.20 mg/dL    Calcium 10.6 (H) 8.6 - 10.4 mg/dL    Sodium 134 (L) 135 - 144 mmol/L    Potassium 3.9 3.7 - 5.3 mmol/L    Chloride 111 (H) 98 - 107 mmol/L    CO2 12 (L) 20 - 31 mmol/L    Anion Gap 11 9 - 17 mmol/L    GFR Non-African American 31 (L) >60 mL/min    GFR  38 (L) >60 mL/min    GFR Comment         Troponin Collection Time: 04/20/22  3:02 PM   Result Value Ref Range    Troponin, High Sensitivity 34 (H) 0 - 22 ng/L   Urinalysis with Reflex to Culture    Collection Time: 04/20/22  3:11 PM    Specimen: Urine, clean catch   Result Value Ref Range    Color, UA Yellow Yellow    Turbidity UA SLIGHTLY CLOUDY (A) Clear    Glucose, Ur NEGATIVE NEGATIVE    Bilirubin Urine NEGATIVE NEGATIVE    Ketones, Urine NEGATIVE NEGATIVE    Specific Gravity, UA 1.010 1.005 - 1.030    Urine Hgb SMALL (A) NEGATIVE    pH, UA 6.5 5.0 - 8.0    Protein, UA 1+ (A) NEGATIVE    Urobilinogen, Urine Normal Normal    Nitrite, Urine NEGATIVE NEGATIVE    Leukocyte Esterase, Urine MODERATE (A) NEGATIVE   Microscopic Urinalysis    Collection Time: 04/20/22  3:11 PM   Result Value Ref Range    -          WBC, UA 20 TO 50 0 - 5 /HPF    RBC, UA 2 TO 5 0 - 2 /HPF    Casts UA 0 TO 2 /LPF    Casts UA COARSELY GRANULAR /LPF    Epithelial Cells UA 5 TO 10 0 - 5 /HPF    Bacteria, UA MANY (A) None    Mucus, UA 1+ (A) None    Amorphous, UA 1+ (A) None    Other Observations UA Culture ordered based on defined criteria. (A) NOT REQ. Imaging/Diagnostics:  No results found.     Assessment :      Hospital Problems           Last Modified POA    * (Principal) UTI (urinary tract infection) 4/20/2022 Yes    Malignant neoplasm metastatic to lung ([facility] [address] 4/20/2022 Yes    Acute kidney injury superimposed on CKD ([facility] [address] 4/20/2022 Yes    Malignant neoplasm of urinary bladder ([facility] [address] 4/20/2022 Yes    Dehydration 4/20/2022 Yes           Plan:     Patient status inpatient in the Med/Surge    Urinary tract infection   -Urinalysis suggestive of infection with many bacteria and moderate leukocyte esterase   -Urine culture pending   -Blood culture x 1 pending   -Maintenance fluids at 100 mL/hr   -Ceftriaxone 1 gram q24h   -Daily CBC   -Daily CMP     MARILYN on CKD   -Creatinine elevated at 2.05 in the ED (baseline 1.7-1.8)   -Maintenance fluids as above   -Daily CMP as above     Bladder cancer with metastatic disease to the lungs   -Patient is currently on Vergie Aiken (follows with Dr. Addie Clark)   -Stable; continue to monitor   -S/P cystectomy with urostomy creation   -S/P left nephroureterectomy     Physical debility   -PT/OT   -Patient's POA is interested in SNF placement     Consultations:   None    Patient is admitted as inpatient status because of co-morbidities listed above, severity of signs and symptoms as outlined, requirement for current medical therapies and most importantly because of direct risk to patient if care not provided in a hospital setting. Expected length of stay > 48 hours.     Hope Opitz, MD  4/20/2022  5:17 PM    Copy sent to Dr. Phoebe Riedel, GABE - CNP

## 2022-04-20 NOTE — ED NOTES
Patient arrived to ER with complaints of recent diagnosis of UTI with worsening symptoms and suggested to have additional urine cultures as well as possible antibiotics and fluids for dehydration. Patient was recently placed on antibiotics for UTI with no relief in symptoms.       Chrissy Unger RN  04/20/22 8641

## 2022-04-21 LAB
ABSOLUTE EOS #: 0.3 K/UL (ref 0–0.4)
ABSOLUTE LYMPH #: 0.7 K/UL (ref 1–4.8)
ABSOLUTE MONO #: 1.4 K/UL (ref 0.1–1.2)
ALBUMIN SERPL-MCNC: 2.6 G/DL (ref 3.5–5.2)
ALBUMIN/GLOBULIN RATIO: 1.1 (ref 1–2.5)
ALP BLD-CCNC: 180 U/L (ref 40–129)
ALT SERPL-CCNC: 26 U/L (ref 5–41)
ANION GAP SERPL CALCULATED.3IONS-SCNC: 11 MMOL/L (ref 9–17)
AST SERPL-CCNC: 22 U/L
BASOPHILS # BLD: 0 % (ref 0–2)
BASOPHILS ABSOLUTE: 0 K/UL (ref 0–0.2)
BILIRUB SERPL-MCNC: 0.27 MG/DL (ref 0.3–1.2)
BUN BLDV-MCNC: 67 MG/DL (ref 8–23)
CALCIUM SERPL-MCNC: 9.5 MG/DL (ref 8.6–10.4)
CHLORIDE BLD-SCNC: 118 MMOL/L (ref 98–107)
CO2: 10 MMOL/L (ref 20–31)
CREAT SERPL-MCNC: 1.7 MG/DL (ref 0.7–1.2)
EKG ATRIAL RATE: 88 BPM
EKG P AXIS: 111 DEGREES
EKG P-R INTERVAL: 206 MS
EKG Q-T INTERVAL: 422 MS
EKG QRS DURATION: 90 MS
EKG QTC CALCULATION (BAZETT): 510 MS
EKG R AXIS: 55 DEGREES
EKG T AXIS: 41 DEGREES
EKG VENTRICULAR RATE: 88 BPM
EOSINOPHILS RELATIVE PERCENT: 2 % (ref 1–4)
GFR AFRICAN AMERICAN: 47 ML/MIN
GFR NON-AFRICAN AMERICAN: 39 ML/MIN
GFR SERPL CREATININE-BSD FRML MDRD: ABNORMAL ML/MIN/{1.73_M2}
GLUCOSE BLD-MCNC: 105 MG/DL (ref 70–99)
HCT VFR BLD CALC: 41.4 % (ref 41–53)
HEMOGLOBIN: 13.5 G/DL (ref 13.5–17.5)
LYMPHOCYTES # BLD: 5 % (ref 24–44)
MCH RBC QN AUTO: 29 PG (ref 26–34)
MCHC RBC AUTO-ENTMCNC: 32.6 G/DL (ref 31–37)
MCV RBC AUTO: 88.9 FL (ref 80–100)
MONOCYTES # BLD: 11 % (ref 2–11)
PDW BLD-RTO: 16.3 % (ref 12.5–15.4)
PLATELET # BLD: 293 K/UL (ref 140–450)
PMV BLD AUTO: 6.9 FL (ref 6–12)
POTASSIUM SERPL-SCNC: 3.8 MMOL/L (ref 3.7–5.3)
RBC # BLD: 4.65 M/UL (ref 4.5–5.9)
SEG NEUTROPHILS: 82 % (ref 36–66)
SEGMENTED NEUTROPHILS ABSOLUTE COUNT: 10.2 K/UL (ref 1.8–7.7)
SODIUM BLD-SCNC: 139 MMOL/L (ref 135–144)
TOTAL PROTEIN: 5 G/DL (ref 6.4–8.3)
WBC # BLD: 12.6 K/UL (ref 3.5–11)

## 2022-04-21 PROCEDURE — 97166 OT EVAL MOD COMPLEX 45 MIN: CPT

## 2022-04-21 PROCEDURE — 97535 SELF CARE MNGMENT TRAINING: CPT

## 2022-04-21 PROCEDURE — 97162 PT EVAL MOD COMPLEX 30 MIN: CPT

## 2022-04-21 PROCEDURE — 1210000000 HC MED SURG R&B

## 2022-04-21 PROCEDURE — 85025 COMPLETE CBC W/AUTO DIFF WBC: CPT

## 2022-04-21 PROCEDURE — 99232 SBSQ HOSP IP/OBS MODERATE 35: CPT | Performed by: STUDENT IN AN ORGANIZED HEALTH CARE EDUCATION/TRAINING PROGRAM

## 2022-04-21 PROCEDURE — 80053 COMPREHEN METABOLIC PANEL: CPT

## 2022-04-21 PROCEDURE — 2580000003 HC RX 258: Performed by: STUDENT IN AN ORGANIZED HEALTH CARE EDUCATION/TRAINING PROGRAM

## 2022-04-21 PROCEDURE — 36415 COLL VENOUS BLD VENIPUNCTURE: CPT

## 2022-04-21 PROCEDURE — 6360000002 HC RX W HCPCS: Performed by: STUDENT IN AN ORGANIZED HEALTH CARE EDUCATION/TRAINING PROGRAM

## 2022-04-21 PROCEDURE — 97116 GAIT TRAINING THERAPY: CPT

## 2022-04-21 RX ADMIN — SODIUM CHLORIDE: 9 INJECTION, SOLUTION INTRAVENOUS at 15:34

## 2022-04-21 RX ADMIN — CEFTRIAXONE SODIUM 1000 MG: 1 INJECTION, POWDER, FOR SOLUTION INTRAMUSCULAR; INTRAVENOUS at 16:49

## 2022-04-21 RX ADMIN — SODIUM CHLORIDE, PRESERVATIVE FREE 10 ML: 5 INJECTION INTRAVENOUS at 20:00

## 2022-04-21 NOTE — CARE COORDINATION
Case Management Initial Discharge Plan  Nayana Santo,           Met with:patient and ramon Walker to discuss discharge plans. Information verified: address, contacts, phone number, , insurance Yes  Insurance Provider: Medicare, [facility]    Emergency Contact/Next of Kin name & number: Subhash Berg [phone]   Who are involved in patient's support system? family    PCP: GABE Holt CNP  Date of last visit: monthly      Discharge Planning    Living Arrangements:    assisted living    Patient able to perform ADL's:Assisted    Current Services (outpatient & in home) lives in assisted living   DME equipment: patient states none  DME provider:     Is patient receiving oral anticoagulation therapy? No    Does patient have any issues/concerns obtaining medications? No  If yes, what are patient's concerns? Is there a preferred Pharmacy after hours or on weekends? No    If yes, which pharmacy? Potential Assistance Needed:       Patient agreeable to home care: Yes  Freedom of choice provided:       Prior SNF/Rehab Placement and Facility: no  Agreeable to SNF/Rehab: No  [place] of choice provided: no     Evaluation: no    Expected Discharge date:       Patient expects to be discharged to: If home: is the family and/or caregiver wiling & able to provide support at home? Lives in assisted living  Who will be providing this support? Follow Up Appointment: Best Day/ Time:      Transportation provider: valentina  Transportation arrangements needed for discharge: Yes    Readmission Risk              Risk of Unplanned Readmission:  16         Does patient have a readmission risk score greater than 14?: Yes  If yes, follow-up appointment must be made within 7 days of discharge.      Goals of Care: infection management      Educated patient ans on transitional options, provided freedom of choice and are agreeable with plan      Discharge Plan: return to [facility] - will upgrade to assisted living with therapy services. Agreeable to Palliative Care consult. Electronically signed by [id number] Le[address] Johnson RN on 4/21/22 at 10:14 AM EDT    Hospice consult place and referral to [facility] sent via [employer], call from Shefali Nicholson - discussed case and updated that meeting to be had at [facility] with patient and niece after discharge. Cleo will reach out to niece to schedule appt. Call to [employer] to discuss needs from CM for patient to return, no answer x 2 will call again.

## 2022-04-21 NOTE — PROGRESS NOTES
Physician Progress Note      Anibal Rivero  CSN #:                  [id number]  :                       1940  ADMIT DATE:       2022 2:11 PM  [address] [place] DATE:  RESPONDING  PROVIDER #:        Deepa Arreola MD          QUERY TEXT:    Pt admitted with UTI. Pt noted to have a urostomy which was placed secondary to   bladder cancer. If possible, please clarify one of the following      The medical record reflects the following:  Risk Factors:age, bladder cancer with Mets with urostomy device , mindi on ckd  Clinical Indicators: admitted with UTI. Pt noted to have a urostomy which was   placed secondary to bladder cancer. Treatment: iv antibiotics, UA , lab monitoring    Thank Jeri Massey RN BSN  CCDS  Email [email or web address]  Cell [phone]  office hours M-F 6am to 2:30pm  Options provided:  -- UTI due to ileal conduit  -- UTI not due to ileal conduit  -- Other - I will add my own diagnosis  -- Disagree - Not applicable / Not valid  -- Disagree - Clinically unable to determine / Unknown  -- Refer to Clinical Documentation Reviewer    PROVIDER RESPONSE TEXT:    UTI is not due to ileal conduit. Query created by:  Pooja Vo on 2022 7:11 AM      Electronically signed by:  Deepa Arreola MD 2022 7:14 AM

## 2022-04-21 NOTE — PROGRESS NOTES
Physical Therapy  Facility/Department: [facility] ICU  Physical Therapy Initial Assessment    Name: Christina Delarosa  : 1940  MRN: [id number]  Date of Service: 2022   Chief Complaint   Patient presents with    Urinary Tract Infection       Discharge Recommendations:  Continue to assess pending progress,Patient would benefit from continued therapy after discharge   PT Equipment Recommendations  Equipment Needed: No      Patient Diagnosis(es): The primary encounter diagnosis was Urinary tract infection without hematuria, site unspecified. Diagnoses of Dehydration and Renal insufficiency were also pertinent to this visit. Past Medical History:  has a past medical history of Bladder cancer ([facility] [address] Chronic kidney disease, and Hypertension. Past Surgical History:  has a past surgical history that includes Bladder removal; Kidney removal (Left); Prostate surgery; Knee Arthroplasty; Rotator cuff repair (Right); Lung biopsy (10/06/2021); CT NEEDLE BIOPSY LUNG PERCUTANEOUS (10/6/2021); and IR PORT PLACEMENT > 5 YEARS (10/25/2021). Assessment   Body Structures, Functions, Activity Limitations Requiring Skilled Therapeutic Intervention: Decreased functional mobility ; Decreased safe awareness;Decreased strength;Decreased endurance;Decreased balance  Assessment: Pt presents with decreased strength and functional mobility with increased fatigue this date. Pt required Min-CGA for transfers and Min assist for gait with RW and without device; gait 30' x 1 and 10' x 1. Pt is at increased fall risk at this time due to weakness, fatigue, and cognition and will require 24/7 assistance upon discharge to previous living arrangement. Pt would benefit from therapy upon discharge as tolerated.   Treatment Diagnosis: dec mobility  Therapy Prognosis: Fair  Decision Making: Medium Complexity  Requires PT Follow-Up: Yes  Activity Tolerance  Activity Tolerance: Patient limited by fatigue;Patient limited by endurance;Treatment limited secondary to decreased cognition     Plan   Plan  Plan: 5-7 times per week  Current Treatment Recommendations: Strengthening,Balance training,Functional mobility training,Transfer training,Gait training,Endurance training,Safety education & training,Patient/Caregiver education & training,Therapeutic activities  Safety Devices  Type of Devices: Bed alarm in place,Call light within reach,Left in bed,Nurse notified  Restraints  Restraints Initially in Place: No     Restrictions  Restrictions/Precautions  Restrictions/Precautions: Fall Risk  Required Braces or Orthoses?: No  Position Activity Restriction  Other position/activity restrictions: Up with assistance     Subjective   General  Patient assessed for rehabilitation services?: Yes  Family / Caregiver Present: Yes  Referring Practitioner: Mat Lane  Referral Date : 04/20/22  Diagnosis: UTI  Follows Commands: Within Functional Limits  Subjective  Subjective: Pt supine in bed and agreeable to therapy. Pt denies pain at this time. Social/Functional History  Social/Functional History  Lives With: Tressa Dunhamk (comment)  Type of Home: Assisted living  Home Layout: One level  Home Access: Level entry  Bathroom Shower/Tub: Walk-in shower  Bathroom Toilet: Handicap height  Bathroom Equipment: Grab bars in [address] [facility] chair  Home Equipment: Grab bars  Has the patient had two or more falls in the past year or any fall with injury in the past year?: No  Receives Help From: Femi Hagen (comment) (staff at [facility])  ADL Assistance: Independent (Can request assistance as needed.)  Homemaking Assistance:  (Meals and laundry provided; pt (I) with med management.)  Homemaking Responsibilities: No  Ambulation Assistance: Independent (No device.)  Transfer Assistance: Independent  Active : No  Occupation: Retired  Additional Comments: Pt lives in [facility] which is level between [address] and Assisted.  Aides take care of ostomy bag but patient was able to empty it up to past week. Vision/Hearing  Vision Exceptions: Wears glasses for reading  Hearing: Exceptions to [facility]  Hearing Exceptions: Hard of hearing/hearing concerns;Bilateral hearing aid    Cognition   Orientation  Overall Orientation Status: Impaired  Orientation Level: Oriented to person;Oriented to place; Disoriented to time  Cognition  Overall Cognitive Status: Exceptions  Safety Judgement: Decreased awareness of need for assistance;Decreased awareness of need for safety  Problem Solving: Decreased awareness of errors;Assistance required to generate solutions  Insights: Decreased awareness of deficits  Initiation: Requires cues for some  Sequencing: Requires cues for some     Objective         Gross Assessment  AROM: Within functional limits  PROM: Within functional limits  Strength: Generally decreased, functional  Tone: Normal  Sensation: Intact     AROM RLE (degrees)  RLE AROM: WFL  AROM LLE (degrees)  LLE AROM : WFL  Strength RLE  Strength RLE: Exception  R Hip Flexion: 4-/5  R Knee Flexion: 4-/5  R Knee Extension: 3+/5  Strength LLE  Strength LLE: Exception  L Hip Flexion: 3/5  L Knee Flexion: 3+/5  L Knee Extension: 4-/5           Bed mobility  Supine to Sit: Contact guard assistance  Sit to Supine: Contact guard assistance  Scooting: Minimal assistance  Comment: Head of bed raised 15 degrees and use of bed rail; pt with posterior lean/LOB upon sitting edge of bed and required increased time and verbal cues to scoot to the edge before regaining balance bedside. Transfers  Sit to Stand: Minimal Assistance;Contact guard assistance  Stand to sit: Minimal Assistance;Contact guard assistance  Comment: Transfers with RW and without device. Pt stood edge of bed x 2 with request to sit due to fatigue, encouragement needed to continue. Pt with increased time and effort standing from toilet. Ambulation  Surface: level tile  Device: Rolling Walker; No Device  Assistance: Minimal assistance  Quality of Gait: decreased step length and height, wide base of support, pt somewhat impulsive, fatigues quickly  Gait Deviations: Slow Melissa;Decreased step length;Decreased step height  Distance: 30' x 1 (RW), 10' x 1 (without device)  Comments: Pt with decreased walker management with increased risk for misuse and falls; pt ambulated 10' back to bed without device with Min assist x 1 and no LOB. Pts balance would improve with use of walker but due to dementia pt would not use it correctly or fail to use it at all. More Ambulation?: No  Stairs/Curb  Stairs?: No     Balance  Posture: Fair  Sitting - Static: Fair;-  Sitting - Dynamic: Fair;-  Standing - Static: Fair;-  Standing - Dynamic: Fair;-  Comments: balance assessed with RW and without device           OutComes Score                                                  AM-PAC Score     AM-PAC Inpatient Mobility without Stair Climbing Raw Score : 16 (04/21/22 1254)  AM-PAC Inpatient without Stair Climbing T-Scale Score : 45.54 (04/21/22 1254)  Mobility Inpatient CMS 0-100% Score: 40.64 (04/21/22 1254)  Mobility Inpatient without Stair CMS G-Code Modifier : CK (04/21/22 1254)       Goals  Long Term Goals  Time Frame for Long term goals : 14 visits  Long term goal 1: Pt to be Independent with bed mobility. Long term goal 2: Pt to be Modified (I) with transfers without LOB. Long term goal 3: Pt to ambulate without device Modified (I) 50' without LOB. Long term goal 4: Pt to perform seated LE PRE's x 20 reps to improve strength for mobility.   Additional Goals?: No  Patient Goals   Patient goals : None stated       Therapy Time   Individual Concurrent Group Co-treatment   Time In 1036         Time Out 1109         Minutes 33         Timed Code Treatment Minutes: 8 Minutes  Variance: [address]imentel, PT

## 2022-04-21 NOTE — PROGRESS NOTES
Physician Progress Note      Navid Calero  LEONIDES #:                  [id number]  :                       1940  ADMIT DATE:       2022 2:11 PM  [facility] DATE:  RESPONDING  PROVIDER #:        Eloise Stanley MD        QUERY TEXT:    Stage of Chronic Kidney Disease: Please provide further specificity, if known. Clinical indicators include: chronic kidney disease, bun, creatinine, gfr, ckd  Options provided:  -- Chronic kidney disease stage 1  -- Chronic kidney disease stage 2  -- Chronic kidney disease stage 3  -- Chronic kidney disease stage 3a  -- Chronic kidney disease stage 3b  -- Chronic kidney disease stage 4  -- Chronic kidney disease stage 5  -- Chronic kidney disease stage 5, requiring dialysis  -- End stage renal disease  -- Other - I will add my own diagnosis  -- Disagree - Not applicable / Not valid  -- Disagree - Clinically Unable to determine / Unknown        PROVIDER RESPONSE TEXT:    The patient has chronic kidney disease stage 3b.       Electronically signed by:  Eloise Stanley MD 2022 7:32 AM

## 2022-04-21 NOTE — PROGRESS NOTES
[facility]  Office: [address], DO, Kalyan Ashley, DO, Jonathan Wheatley, DO, Zulema King Blood, DO, Peter Dahl MD, Mignon Meckel, MD, Mohini Devine MD, Brenda Smith MD, Connor Vigil MD, Judy Fernandes MD, Nicolas Contreras MD, Ignacia Barrow, DO, Rajendra Waller, DO, Tammy North MD,  Ambrose Hinds, DO, Viji Penn MD, Miri Galloway MD, Jonna Kothari MD, Laureen Sainz DO, Rica Loving MD, Swati Jimenez MD, Sara Billings, [facility], [facility], CNP, Dangelo Mckay, CNP, Urvashi Rutledge, CNP, Prosper Ordaz, CNP, Becca Live, CNP, Karyna Stewart PA-C, Elle Andrews, DNP, Speedy Richey, CNP, Caryle Poet, CNP, Candido Steiner, CNP, Conor Platt, CNS, Ayla Anguiano, DNP, Michael Steven, CNP, Denis Cisneros, CNP, Kiko William, CNP         [facility]    Progress Note    4/21/2022    11:16 AM    Name:   Kasi Mortensen  MRN:     [id number]     Acct:      [de-identified]   Room:   [address] [place] [address] Day:  1  Admit Date:  4/20/2022  2:11 PM    PCP:   GABE Carmona CNP  Code Status:  DNR-CC    Subjective:     C/C:   Chief Complaint   Patient presents with    Urinary Tract Infection     Interval History Status: improved. Patient was seen and examined at bedside this AM. He reports feeling much better and is without complaint. Denies fever/chills, nausea/vomiting, shortness of breath or chest pain. Urine culture pending. Anticipate discharge tomorrow AM if the patient continues to improve. Brief History:     Kasi Mortensen is a 79 y.o. male with a past medical history of bladder cancer with metastatic disease to the lungs (currently on Keytruda), cystectomy, left nephroureterectomy and chronic kidney disease who presented to the emergency department on 4/20/2022 complaining of malaise, fatigue and anorexia. The patient's POA (Anahi) states that his symptoms began last week and have progressively worsened.  The patient was seen in the emergency department at [facility] on Friday and was discharged following a dose of ceftriaxone and a fluid bolus. The patient states that he has not \"bounced back\" since then and continues to experience profound fatigue and malaise. In the ED, the patient was afebrile and nontoxic appearing. Urinalysis was suggestive of UTI with many bacteria and moderate leukocyte esterase. The patient's POA expressed concerns regarding the patient's ability to care for himself in his assisted living community and states that she would like him to be placed in a SNF if possible. The patient is admitted to internal medicine for further management of dehydration and urinary tract infection. Discussed goals of care with the patient and his power of . The patient has signed [facility] paperwork at his facility and would like to remain a DNR-CC on admission. Review of Systems:     Constitutional:  negative for chills, fevers, sweats  Respiratory:  negative for cough, dyspnea on exertion, shortness of breath, wheezing  Cardiovascular:  negative for chest pain, chest pressure/discomfort, lower extremity edema, palpitations  Gastrointestinal:  negative for abdominal pain, constipation, diarrhea, nausea, vomiting  Neurological:  negative for dizziness, headache    Medications: Allergies:     Allergies   Allergen Reactions    Codeine Anxiety and Other (See Comments)     zay         Current Meds:   Scheduled Meds:    sodium chloride flush  5-40 mL IntraVENous 2 times per day    cefTRIAXone (ROCEPHIN) IV  1,000 mg IntraVENous Q24H    heparin (porcine)  5,000 Units SubCUTAneous TID     Continuous Infusions:    sodium chloride      sodium chloride 100 mL/hr at 04/20/22 1853     PRN Meds: sodium chloride flush, sodium chloride, ondansetron **OR** ondansetron, polyethylene glycol, acetaminophen **OR** acetaminophen    Data:     Past Medical History:   has a past medical history of Bladder cancer ([facility] [address] Chronic kidney disease, and Hypertension. Social History:   reports that he has never smoked. He has never used smokeless tobacco. He reports previous alcohol use. He reports that he does not use drugs. Family History:   Family History   Problem Relation Age of Onset    Heart Disease Mother     Stroke Father     Breast Cancer Father     Other Sister         ALS    Heart Disease Brother        Vitals:  BP (!) 103/57   Pulse 88   Temp 97.5 °F (36.4 °C) (Oral)   Resp 18   Ht 5' 6\" (1.676 m)   Wt 135 lb (61.2 kg)   SpO2 98%   BMI 21.79 kg/m²   Temp (24hrs), Av.8 °F (36.6 °C), Min:97.5 °F (36.4 °C), Max:98.2 °F (36.8 °C)    No results for input(s): POCGLU in the last 72 hours. I/O (24Hr):     Intake/Output Summary (Last 24 hours) at 2022 1116  Last data filed at 2022 1100  Gross per 24 hour   Intake 10 ml   Output 2070 ml   Net -2060 ml       Labs:  Hematology:  Recent Labs     22  1502 22  0558   WBC 12.8* 12.6*   RBC 5.47 4.65   HGB 15.5 13.5   HCT 48.3 41.4   MCV 88.3 88.9   MCH 28.3 29.0   MCHC 32.0 32.6   RDW 17.1* 16.3*    293   MPV 6.9 6.9     Chemistry:  Recent Labs     22  1502 22  1720 22  0558   *  --  139   K 3.9  --  3.8   *  --  118*   CO2 12*  --  10*   GLUCOSE 120*  --  105*   BUN 74*  --  67*   CREATININE 2.05*  --  1.70*   ANIONGAP 11  --  11   LABGLOM 31*  --  39*   GFRAA 38*  --  47*   CALCIUM 10.6*  --  9.5   TROPHS 34* 27*  --      Recent Labs     22  0558   PROT 5.0*   LABALBU 2.6*   AST 22   ALT 26   ALKPHOS 180*   BILITOT 0.27*     ABG:No results found for: POCPH, PHART, PH, POCPCO2, ZPE6ZXM, PCO2, POCPO2, PO2ART, PO2, POCHCO3, SWM0KBE, HCO3, NBEA, PBEA, BEART, BE, THGBART, THB, OPE0VED, PGSX4BOQ, D9CNDULI, O2SAT, FIO2  Lab Results   Component Value Date/Time    [facility] 2022 06:35 PM     Lab Results   Component Value Date/Time    CULTURE NO GROWTH <24 HRS 2022 06:35 PM       Radiology:  No results found.    Physical Examination:     General appearance:  alert, cooperative and no distress  Mental Status:  oriented to person, place and time and normal affect  Lungs:  clear to auscultation bilaterally, normal effort  Heart:  regular rate and rhythm, no murmur  Abdomen:  Urostomy bag in place.    Extremities:  no edema, redness, tenderness in the calves  Skin:  no gross lesions, rashes, induration    Assessment:     Hospital Problems           Last Modified POA    * (Principal) UTI (urinary tract infection) 4/20/2022 Yes    Malignant neoplasm metastatic to lung ([facility] [address] 4/20/2022 Yes    Acute kidney injury superimposed on CKD ([address] 4/20/2022 Yes    Malignant neoplasm of urinary bladder ([facility] [address] 4/20/2022 Yes    Dehydration 4/20/2022 Yes          Plan:     Urinary tract infection   -Urinalysis suggestive of infection with many bacteria and moderate leukocyte esterase   -Urine culture pending   -Blood culture x 1 NGTD  -Maintenance fluids at 100 mL/hr   -Ceftriaxone 1 gram q24h   -Daily CBC   -Daily CMP      MARILYN on CKD, resolved   -Creatinine has returned to baseline   -Continue maintenance fluids as above   -Daily CMP as above      Bladder cancer with metastatic disease to the lungs   -Patient is currently on Paulino Rahman (follows with Dr. Danitza Luis)   -Stable; continue to monitor   -S/P cystectomy with urostomy creation   -S/P left nephroureterectomy      Physical debility   -PT/OT   -Patient's POA is interested in SNF placement     Silviano Uribe MD  4/21/2022  11:16 AM

## 2022-04-21 NOTE — PLAN OF CARE
Problem: Discharge Planning  Goal: Discharge to home or other facility with appropriate resources  Outcome: Progressing     Problem: Skin/Tissue Integrity  Goal: Absence of new skin breakdown  Description: 1. Monitor for areas of redness and/or skin breakdown  2. Assess vascular access sites hourly  3. Every 4-6 hours minimum:  Change oxygen saturation probe site  4. Every 4-6 hours:  If on nasal continuous positive airway pressure, respiratory therapy assess nares and determine need for appliance change or resting period.   Outcome: Progressing     Problem: Safety - Adult  Goal: Free from fall injury  Outcome: Progressing

## 2022-04-21 NOTE — PROGRESS NOTES
fatigue  Safety Devices  Type of Devices: Left in bed, call light within reach, bed alarm activated, RN aware  Restraints Initially in Place: No        Plan   Plan  Times per Week: 5-6x/wk  Times per Day: Daily  Current Treatment Recommendations: Strengthening,Balance training,Functional mobility training,Endurance training,Equipment evaluation, education, & procurement,Patient/Caregiver education & training,Safety education & training,Self-Care / ADL,Coordination training     Restrictions  Restrictions/Precautions  Restrictions/Precautions: Fall Risk  Required Braces or Orthoses?: No  Position Activity Restriction  Other position/activity restrictions: Up with assistance    Subjective   General  Patient assessed for rehabilitation services?: Yes  Family / Caregiver Present: Yes (niece)  General Comment  Comments: RN ok'd for therapy this AM. Pt agreeable to participate in session following encouragement, pt pleasant/cooperative throughout.   Pre Treatment Pain Screening  Pain at present:  (Pt with no complaints of pain throughout session.)  Patient Education: Pt and niece educated on OT services/OT POC, Safety Awareness/Fall Prevention, Activity Promotion, Bed Mobility Techniques, Safety with Transfers/RW Mngt, ADL Techniques; pt and niece verbalized understanding of all education provided however pt with poor carryover requiring VCs throughout session    Oxygen Therapy  O2 Device: None (Room air)     Social/Functional History  Social/Functional History  Lives With: Alone,Other (comment)  Type of Home: Assisted living  Home Layout: One level  Home Access: Level entry  Bathroom Shower/Tub: Walk-in shower  Bathroom Toilet: Handicap height  Bathroom Equipment: Grab bars in shower,Shower chair  Home Equipment: Grab bars  Has the patient had two or more falls in the past year or any fall with injury in the past year?: No  Receives Help From: Family,Other (comment) (staff at [facility])  ADL Assistance: Independent (Can request assistance as needed.)  Homemaking Assistance:  (Meals and laundry provided; pt (I) with med management.)  Homemaking Responsibilities: No  Ambulation Assistance: Independent (No device.)  Transfer Assistance: Independent  Active : No  Occupation: Retired  Additional Comments: Pt lives in [facility] which is level between [facility] and [address] Aides take care of ostomy bag but patient was able to empty it up to past week. Objective   Vision Exceptions: Wears glasses for reading  Hearing: Exceptions to [facility]  Hearing Exceptions: Hard of hearing/hearing concerns;Bilateral hearing aid         Balance  Sitting Balance: Minimal assistance (pt seated EOB ~8-10 minutes with CGA to min A required throughout as pt with posterior lean; pt seated on toilet ~5 minutes with CGA and UE support on bilateral grab bars)  Standing Balance: Minimal assistance (x2)  Standing Balance  Time: ~30 seconds to ~2 minute bouts  Activity: 5x bouts of standing/mobility throughout session; static standing at EOB, functional mobility to/from bathroom, toileting tasks- brief mngt and bottom hygiene, standing sink side to perform hand hygiene  Comment: min A x1-2 throughout, initially attempted with RW however pt not receptive to education regarding RW mngt/safety and pushes RW to the side to reach for walls/furniture therefore hand held assistance provided; mildly unsteady throughout with posterior lean; increased time/effort to perform as pt with slow pace and report of fatigue following minimal exertion    Functional Mobility  Functional - Mobility Device:  (attempted with RW and with bilateral hand held assistance)  Activity: To/from bathroom  Assist Level: Minimal assistance (x2)    Toilet Transfers  Toilet - Technique: Ambulating  Equipment Used: Standard toilet (with use of bilateral grab bars)  Toilet Transfer: Minimal assistance;2 Person assistance     ADL  Feeding: Setup;Verbal cueing; Increased time to complete  Grooming: Increased time to complete;Minimal assistance  Grooming Skilled Clinical Factors: Min A for standing balance while sink side to perform hand hygiene, mod VCs for initation/sequencing of task, increased time/effort to perform  UE Bathing: Increased time to complete;Minimal assistance  LE Bathing: Increased time to complete; Moderate assistance  UE Dressing: Increased time to complete;Minimal assistance  LE Dressing: Increased time to complete; Moderate assistance (seated EOB to adjust socks)  LE Dressing Skilled Clinical Factors: Decreased static/dynamic sitting balance while sitting EOB and while performing forward trunk flexion to reach bilateral feet with BUE; decreased activity tolerance with noted fatigue following minimal exertion; decreased BUE FMC and strength/endurance for gripping socks and pulling up over ankles  Toileting: Increased time to complete; Moderate assistance (CGA for LB clothing mngt, min A for toilet transfer, mod A for bottom hygiene; max A required for ostomy bag mngt however at baseline pt requires such assistance and facility provides for pt)        Bed mobility  Supine to Sit: Contact guard assistance (HOB raised ~15* only, use of bed rail)  Sit to Supine: Contact guard assistance  Scooting: Minimal assistance  Comment: Mod verbal/tactile cues for proper hand placement/sequencing/problem solving throughout; increased time/effort to perform     Transfers  Sit to stand: Minimal assistance;2 Person assistance  Stand to sit: Minimal assistance;2 Person assistance  Transfer Comments: Mod verbal/tactile cues for proper hand placement/sequencing/safety awareness throughout; increased time/effort to perform        Cognition  Orientation: Impaired (Oriented to person and place, disoriented to time)  Overall Cognitive Status: Exceptions  Arousal/Alertness: Delayed responses to stimuli  Following Commands: Follows one step commands with increased time; Follows one step commands with repetition  Attention Span: Attends with cues to redirect; Difficulty attending to directions  Safety Judgement: Decreased awareness of need for assistance;Decreased awareness of need for safety  Problem Solving: Decreased awareness of errors;Assistance required to generate solutions;Assistance required to identify errors made;Assistance required to implement solutions  Insights: Decreased awareness of deficits  Initiation: Requires cues for some  Sequencing: Requires cues for some       LUE AROM (degrees)  LUE AROM : WFL  Left Hand AROM (degrees)  Left Hand AROM: WFL  RUE AROM (degrees)  RUE AROM : WFL  Right Hand AROM (degrees)  Right Hand AROM: WFL     Gross Assessment  AROM BUE: Generally decreased, functional  BUE Strength: Generally decreased, functional  BUE FMC: Decreased  BUE GMC: Generally decreased, functional  BUE Tone: Normal    Sensation: WFL (Pt declines any numbness/tingling)        AM-PAC Score   AM-[facility] Inpatient Daily Activity Raw Score: 16 (04/21/22 [facility]7)  AM-PAC Inpatient ADL T-Scale Score : 35.96 (04/21/22 1237)  ADL Inpatient CMS 0-100% Score: 53.32 (04/21/22 [facility])  ADL Inpatient CMS G-Code Modifier : CK (04/21/22 1237)    Goals  Short Term Goals  Time Frame for Short term goals: 14 visits  Short Term Goal 1: Pt will perform ADL tasks independently  Short Term Goal 2: Pt will independently perform functional transfers/functional mobility  Short Term Goal 3: Pt will independently demo good safety awareness during engagement in all ADLs and functional transfers/functional mobility  Short Term Goal 4: Pt will demo 8+ minutes standing tolerance for increased participation in ADL tasks       Therapy Time   Individual Concurrent Group Co-treatment   Time In 1036         Time Out 1111         Minutes 35         Timed Code Treatment Minutes: 10 Minutes       Karli Tipton, OTR/L

## 2022-04-21 NOTE — PLAN OF CARE
Problem: Discharge Planning  Goal: Discharge to home or other facility with appropriate resources  Outcome: Progressing     Problem: Skin/Tissue Integrity  Goal: Absence of new skin breakdown  Description: 1. Monitor for areas of redness and/or skin breakdown  2. Assess vascular access sites hourly  3. Every 4-6 hours minimum:  Change oxygen saturation probe site  4. Every 4-6 hours:  If on nasal continuous positive airway pressure, respiratory therapy assess nares and determine need for appliance change or resting period.   4/21/2022 0003 by Homero Hunt RN  Outcome: Progressing  4/20/2022 1904 by Beatriz Fish RN  Outcome: Progressing  4/20/2022 1834 by Beatriz Fish RN  Outcome: Progressing     Problem: Safety - Adult  Goal: Free from fall injury  4/21/2022 0003 by Homero Hunt RN  Outcome: Progressing  4/20/2022 1904 by Beatriz Fish RN  Outcome: Progressing  4/20/2022 1834 by Beatriz Fish, RN  Outcome: Progressing

## 2022-04-22 VITALS
WEIGHT: 135 LBS | HEIGHT: 66 IN | DIASTOLIC BLOOD PRESSURE: 51 MMHG | OXYGEN SATURATION: 96 % | SYSTOLIC BLOOD PRESSURE: 123 MMHG | TEMPERATURE: 97.7 F | HEART RATE: 83 BPM | BODY MASS INDEX: 21.69 KG/M2 | RESPIRATION RATE: 16 BRPM

## 2022-04-22 LAB
ABSOLUTE EOS #: 0.1 K/UL (ref 0–0.4)
ABSOLUTE LYMPH #: 0.5 K/UL (ref 1–4.8)
ABSOLUTE MONO #: 1.3 K/UL (ref 0.1–1.2)
ALBUMIN SERPL-MCNC: 2.3 G/DL (ref 3.5–5.2)
ALBUMIN/GLOBULIN RATIO: 1 (ref 1–2.5)
ALP BLD-CCNC: 161 U/L (ref 40–129)
ALT SERPL-CCNC: 34 U/L (ref 5–41)
ANION GAP SERPL CALCULATED.3IONS-SCNC: 10 MMOL/L (ref 9–17)
AST SERPL-CCNC: 25 U/L
BASOPHILS # BLD: 0 % (ref 0–2)
BASOPHILS ABSOLUTE: 0 K/UL (ref 0–0.2)
BILIRUB SERPL-MCNC: 0.2 MG/DL (ref 0.3–1.2)
BUN BLDV-MCNC: 59 MG/DL (ref 8–23)
CALCIUM SERPL-MCNC: 9.2 MG/DL (ref 8.6–10.4)
CHLORIDE BLD-SCNC: 119 MMOL/L (ref 98–107)
CO2: 11 MMOL/L (ref 20–31)
CREAT SERPL-MCNC: 1.52 MG/DL (ref 0.7–1.2)
CULTURE: ABNORMAL
EOSINOPHILS RELATIVE PERCENT: 1 % (ref 1–4)
GFR AFRICAN AMERICAN: 53 ML/MIN
GFR NON-AFRICAN AMERICAN: 44 ML/MIN
GFR SERPL CREATININE-BSD FRML MDRD: ABNORMAL ML/MIN/{1.73_M2}
GLUCOSE BLD-MCNC: 102 MG/DL (ref 70–99)
HCT VFR BLD CALC: 39.1 % (ref 41–53)
HEMOGLOBIN: 12.6 G/DL (ref 13.5–17.5)
LYMPHOCYTES # BLD: 4 % (ref 24–44)
MAGNESIUM: 1.8 MG/DL (ref 1.6–2.6)
MCH RBC QN AUTO: 28.4 PG (ref 26–34)
MCHC RBC AUTO-ENTMCNC: 32.3 G/DL (ref 31–37)
MCV RBC AUTO: 88 FL (ref 80–100)
MONOCYTES # BLD: 11 % (ref 2–11)
PDW BLD-RTO: 16.6 % (ref 12.5–15.4)
PLATELET # BLD: 280 K/UL (ref 140–450)
PMV BLD AUTO: 6.9 FL (ref 6–12)
POTASSIUM SERPL-SCNC: 3.3 MMOL/L (ref 3.7–5.3)
RBC # BLD: 4.45 M/UL (ref 4.5–5.9)
SEG NEUTROPHILS: 84 % (ref 36–66)
SEGMENTED NEUTROPHILS ABSOLUTE COUNT: 10 K/UL (ref 1.8–7.7)
SODIUM BLD-SCNC: 140 MMOL/L (ref 135–144)
SPECIMEN DESCRIPTION: ABNORMAL
TOTAL PROTEIN: 4.6 G/DL (ref 6.4–8.3)
WBC # BLD: 11.9 K/UL (ref 3.5–11)

## 2022-04-22 PROCEDURE — 2580000003 HC RX 258: Performed by: STUDENT IN AN ORGANIZED HEALTH CARE EDUCATION/TRAINING PROGRAM

## 2022-04-22 PROCEDURE — 99238 HOSP IP/OBS DSCHRG MGMT 30/<: CPT | Performed by: STUDENT IN AN ORGANIZED HEALTH CARE EDUCATION/TRAINING PROGRAM

## 2022-04-22 PROCEDURE — 6360000002 HC RX W HCPCS: Performed by: STUDENT IN AN ORGANIZED HEALTH CARE EDUCATION/TRAINING PROGRAM

## 2022-04-22 PROCEDURE — 85025 COMPLETE CBC W/AUTO DIFF WBC: CPT

## 2022-04-22 PROCEDURE — 36415 COLL VENOUS BLD VENIPUNCTURE: CPT

## 2022-04-22 PROCEDURE — 83735 ASSAY OF MAGNESIUM: CPT

## 2022-04-22 PROCEDURE — 80053 COMPREHEN METABOLIC PANEL: CPT

## 2022-04-22 RX ADMIN — CEFTRIAXONE SODIUM 1000 MG: 1 INJECTION, POWDER, FOR SOLUTION INTRAMUSCULAR; INTRAVENOUS at 15:18

## 2022-04-22 RX ADMIN — SODIUM CHLORIDE: 9 INJECTION, SOLUTION INTRAVENOUS at 12:03

## 2022-04-22 RX ADMIN — HEPARIN SODIUM 5000 UNITS: 5000 INJECTION INTRAVENOUS; SUBCUTANEOUS at 15:15

## 2022-04-22 NOTE — CARE COORDINATION
08:38- Writer spoke with [facility]. Patient's room is ready for return, call report to [phone]. Transport request faxed to [facility]. 09:57  [facility] contacted regarding  time, after 16:30. ramon Christian updated on plan of care. Will contact with updates. 12:30 [facility] to transport patient 16:00.      14:46 Anahi notified of transport.

## 2022-04-22 NOTE — DISCHARGE SUMMARY
POA (Anahi) states that his symptoms began last week and have progressively worsened. The patient was seen in the emergency department at [place] [address] on Friday and was discharged following a dose of ceftriaxone and a fluid bolus. The patient states that he has not \"bounced back\" since then and continues to experience profound fatigue and malaise. In the ED, the patient was afebrile and nontoxic appearing. Urinalysis was suggestive of UTI with many bacteria and moderate leukocyte esterase. The patient's POA expressed concerns regarding the patient's ability to care for himself in his assisted living community and states that she would like him to be placed in a SNF if possible. The patient was admitted to internal medicine for further management of dehydration and urinary tract infection. Discussed goals of care with the patient and his power of . The patient has signed [facility] paperwork at his facility and would like to remain a DNR-CC on admission. The patient improved with antibiotics and fluids during hospitalization. He is discharged today (4/22) in stable condition. The patient is instructed to follow-up with Hospice services at his assisted living facility. Significant therapeutic interventions: Antibiotics; fluids     Significant Diagnostic Studies:   Labs / Micro:  BMP:    Lab Results   Component Value Date    GLUCOSE 102 04/22/2022     04/22/2022    K 3.3 04/22/2022     04/22/2022    CO2 11 04/22/2022    ANIONGAP 10 04/22/2022    BUN 59 04/22/2022    CREATININE 1.52 04/22/2022    BUNCRER NOT REPORTED 02/09/2022    CALCIUM 9.2 04/22/2022    LABGLOM 44 04/22/2022    GFRAA 53 04/22/2022    GFR      04/22/2022     Radiology:  No results found.     Consultations:    Consults:     Final Specialist Recommendations/Findings:   IP CONSULT TO HOSPICE      The patient was seen and examined on day of discharge and this discharge summary is in conjunction with any daily progress note from day of discharge. Discharge plan:     Disposition: To a non-[facility] facility    Physician Follow Up: Follow-up with Hospice services at your facility     Requiring Further Evaluation/Follow Up POST HOSPITALIZATION/Incidental Findings: None. Diet: regular diet    Activity: As tolerated    Instructions to Patient: Follow-up with palliative care/Hospice services at your facility. Discharge Medications:      Medication List      CONTINUE taking these medications    acetaminophen 325 MG tablet  Commonly known as: TYLENOL     ondansetron 4 MG tablet  Commonly known as: ZOFRAN            No discharge procedures on file. Time Spent on discharge is  20 mins in patient examination, evaluation, counseling as well as medication reconciliation, prescriptions for required medications, discharge plan and follow up. Electronically signed by   Shell Baer MD  4/22/2022  11:08 AM      Thank you GABE Burrell - JACK for the opportunity to be involved in this patient's care.

## 2022-04-22 NOTE — DISCHARGE INSTR - COC
Continuity of Care Form    Patient Name: Иван De Leon   :  1940  MRN:  [id number]    Admit date:  2022  Discharge date:  ***    Code Status Order: DNR-CC   Advance Directives:      Admitting Physician:  Eldon Stringer MD  PCP: GABE Corcoran CNP    Discharging Nurse: [facility] Unit/Room#: 628/294-73  Discharging Unit Phone Number: ***    Emergency Contact:   Extended Emergency Contact Information  Primary Emergency Contact: Eva Valente  Home Phone: [phone]  Relation: Niece/Nephew  Secondary Emergency Contact: Renuka Michelle  Home Phone: [phone]  Relation: Niece/Nephew    Past Surgical History:  Past Surgical History:   Procedure Laterality Date    BLADDER REMOVAL      CT NEEDLE BIOPSY LUNG PERCUTANEOUS  10/6/2021    CT NEEDLE BIOPSY LUNG PERCUTANEOUS 10/6/2021 [facility] CT SCAN    IR PORT PLACEMENT EQUAL OR GREATER THAN 5 YEARS  10/25/2021    IR PORT PLACEMENT EQUAL OR GREATER THAN 5 YEARS 10/25/2021 [facility] SPECIAL PROCEDURES    KIDNEY REMOVAL Left     KNEE ARTHROPLASTY      LUNG BIOPSY  10/06/2021    PROSTATE SURGERY      ROTATOR CUFF REPAIR Right        Immunization History: There is no immunization history on file for this patient.     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    Acute kidney injury superimposed on CKD (HCC) N17.9, N18.9       Isolation/Infection:   Isolation            No Isolation          Patient Infection Status       None to display            Nurse Assessment:  Last Vital Signs: BP (!) 97/55   Pulse 76   Temp 98.1 °F (36.7 °C) (Oral)   Resp 18   Ht 5' 6\" (1.676 m)   Wt 135 lb (61.2 kg)   SpO2 97%   BMI 21.79 kg/m²     Last documented pain score (0-10 scale):    Last Weight:   Wt Readings from Last 1 Encounters:   22 135 lb (61.2 kg)     Mental Status: disoriented and alert    IV Access:  - None    Nursing Mobility/ADLs:  Walking   Dependent  Transfer  Dependent  Bathing  Dependent  Dressing  Dependent  Toileting  Dependent  Feeding  Assisted  Med Admin  Assisted  Med Delivery   whole    Wound Care Documentation and Therapy:        Elimination:  Continence: Bowel: No  Bladder: No  Urinary Catheter: None   Colostomy/Ileostomy/Ileal Conduit: {YES / [id number]}  Urostomy RUQ-Stomal Appliance: Clean, dry & intact    Date of Last BM: ***    Intake/Output Summary (Last 24 hours) at 4/22/2022 0741  Last data filed at 4/22/2022 9634  Gross per 24 hour   Intake --   Output 2725 ml   Net -2725 ml     I/O last 3 completed shifts: In: 10 [I.V.:10]  Out: 100 [facility] [Urine:3745]    Safety Concerns: At Risk for Falls    Impairments/Disabilities:      None    Nutrition Therapy:  Current Nutrition Therapy:   - Oral Diet:  General    Routes of Feeding: Oral  Liquids: No Restrictions  Daily Fluid Restriction: no  Last Modified Barium Swallow with Video (Video Swallowing Test): not done    Treatments at the Time of Hospital Discharge:   Respiratory Treatments: ***  Oxygen Therapy:  is not on home oxygen therapy.   Ventilator:    - No ventilator support    Rehab Therapies: Physical Therapy and Occupational Therapy  Weight Bearing Status/Restrictions: No weight bearing restrictions  Other Medical Equipment (for information only, NOT a DME order):  wheelchair  Other Treatments:       Patient's personal belongings (please select all that are sent with patient):  Shoes, pants, shirt    RN SIGNATURE:  Electronically signed by Chris Edwards RN on 4/22/22 at 4:34 PM EDT    CASE MANAGEMENT/SOCIAL WORK SECTION    Inpatient Status Date: ***    Readmission Risk Assessment Score:  Readmission Risk              Risk of Unplanned Readmission:  17           Discharging to Facility/ Agency   Name: [facility]  [address] [facility], [facility]  Phone: [phone]  Fax:    Dialysis Facility (if applicable)   Name:  Address:  Dialysis Schedule:  Phone:  Fax:    / signature: Electronically signed by Patrice Pinto RN on 4/22/22 at 8:43 AM EDT    PHYSICIAN SECTION    Prognosis: Guarded    Condition at Discharge: Stable    Rehab Potential (if transferring to Rehab): Poor    Recommended Labs or Other Treatments After Discharge: PT/OT; nutritional supplementation     Physician Certification: I certify the above information and transfer of Jude Yang  is necessary for the continuing treatment of the diagnosis listed and that he requires assisted living for greater 30 days.      Update Admission H&P: No change in H&P    PHYSICIAN SIGNATURE:  Electronically signed by Otto Pierre MD on 4/22/22 at 7:41 AM EDT

## 2022-04-22 NOTE — DISCHARGE INSTR - DIET

## 2022-04-22 NOTE — CARE COORDINATION
[facility] ICU Quality Flow/Interdisciplinary Rounds Progress Note    Quality Flow Rounds held on April 22, 2022 at [address] Attending:  Bedside Nurse, , , Nursing Unit Leadership, Dietary, Respiratory Therapy, Physical Therapy and Occupational Therapy    Anticipated Discharge Date: 4/22/22  Anticipated Discharge Disposition: [facility] [address]     Readmission Risk              Risk of Unplanned Readmission:  17           Discussed patient goal for the day, patient clinical progression, and barriers to discharge. The following Goal(s) of the Day/Commitment(s) have been identified:  IV antibiotics and discharge to [facility].        Luis Trivedi RN  April 22, 2022

## 2022-04-25 LAB
CULTURE: NORMAL
Lab: NORMAL
SPECIMEN DESCRIPTION: NORMAL